# Patient Record
Sex: FEMALE | Race: WHITE | Employment: OTHER | URBAN - NONMETROPOLITAN AREA
[De-identification: names, ages, dates, MRNs, and addresses within clinical notes are randomized per-mention and may not be internally consistent; named-entity substitution may affect disease eponyms.]

---

## 2017-01-01 ENCOUNTER — TELEPHONE (OUTPATIENT)
Dept: CARDIOLOGY | Facility: OTHER | Age: 82
End: 2017-01-01

## 2017-01-01 ENCOUNTER — OFFICE VISIT (OUTPATIENT)
Dept: FAMILY MEDICINE | Facility: OTHER | Age: 82
End: 2017-01-01
Payer: COMMERCIAL

## 2017-01-01 ENCOUNTER — OFFICE VISIT (OUTPATIENT)
Dept: FAMILY MEDICINE | Facility: OTHER | Age: 82
End: 2017-01-01
Attending: FAMILY MEDICINE
Payer: COMMERCIAL

## 2017-01-01 DIAGNOSIS — Z51.5 HOSPICE CARE: Primary | ICD-10-CM

## 2017-01-01 PROCEDURE — 99207 ZZC NO CHARGE LOS: CPT | Performed by: FAMILY MEDICINE

## 2017-04-11 ENCOUNTER — OFFICE VISIT (OUTPATIENT)
Dept: CARDIOLOGY | Facility: OTHER | Age: 82
End: 2017-04-11
Attending: INTERNAL MEDICINE
Payer: COMMERCIAL

## 2017-04-11 DIAGNOSIS — I48.21 PERMANENT ATRIAL FIBRILLATION (H): Primary | ICD-10-CM

## 2017-04-11 DIAGNOSIS — I49.5 SINOATRIAL NODE DYSFUNCTION (H): ICD-10-CM

## 2017-04-11 PROBLEM — Z95.0 CARDIAC PACEMAKER IN SITU: Status: ACTIVE | Noted: 2017-04-11

## 2017-04-11 PROCEDURE — 93280 PM DEVICE PROGR EVAL DUAL: CPT | Mod: TC

## 2017-04-11 RX ORDER — WARFARIN SODIUM 2.5 MG/1
2.5 TABLET ORAL DAILY
Qty: 30 TABLET | Refills: 11 | Status: SHIPPED | OUTPATIENT
Start: 2017-04-11 | End: 2017-05-08

## 2017-04-11 NOTE — MR AVS SNAPSHOT
After Visit Summary   4/11/2017    Ricarda Bae    MRN: [id number]           Patient Information     Date Of Birth          1935        Visit Information        Provider Department      4/11/2017 11:30 AM  PACEMAKER [facility]        Today's Diagnoses     Permanent atrial fibrillation (H)    -  1    Sinoatrial node dysfunction (H)          Care Instructions    It was a pleasure to see you in clinic today.  Please do not hesitate to call with any questions or concerns.  You are scheduled for a remote transmission on 7/13/17.  We look forward to seeing you in clinic at your next device check in 6 months.    Bailey Driscoll, RN, MS, CCRN  Electrophysiology Nurse Clinician  [facility]    During Business Hours Please Call:  [phone]  After Hours Please Call:  [phone] - select option #4 and ask for job code [phone]                      Follow-ups after your visit        Additional Services     INR CLINIC REFERRAL       Your provider has referred you to INR Services.    Please be aware that coverage of these services is subject to the terms and limitations of your health insurance plan.  Call member services at your health plan with any benefit or coverage questions.    Indication for Anticoagulation: Atrial Fibrillation  If nonstandard INR is desired, indicate goal range and explanation: 2.0 - 2.5  Expected Duration of Therapy: Lifetime                  Follow-up notes from your care team     Return in about 6 months (around 10/11/2017) for Pacemaker Check.      Your next 10 appointments already scheduled     Apr 24, 2017  1:00 PM CDT   (Arrive by 12:45 PM)   New Visit with Devon Mccord DO   [facility]    [address] [place] [address]   [phone]            Oct 10, 2017  1:00 PM CDT   (Arrive by 12:45 PM)   New Visit with  PACEMAKER   [facility] [place] ([facility])    [address] [address]   [phone]              Who to contact     If you have questions or need follow up information about today's clinic visit or your schedule please contact [facility] directly at [phone].  Normal or non-critical lab and imaging results will be communicated to you by MyChart, letter or phone within 4 business days after the clinic has received the results. If you do not hear from us within 7 days, please contact the clinic through MyChart or phone. If you have a critical or abnormal lab result, we will notify you by phone as soon as possible.  Submit refill requests through [employer] or call your pharmacy and they will forward the refill request to us. Please allow 3 business days for your refill to be completed.          Additional Information About Your Visit        Ease [employer] Information     [employer] lets you send messages to your doctor, view your test results, renew your prescriptions, schedule appointments and more. To sign up, go to [email or web address] . Click on \"Log in\" on the left side of the screen, which will take you to the Welcome page. Then click on \"Sign up Now\" on the right side of the page.     You will be asked to enter the access code listed below, as well as some personal information. Please follow the directions to create your username and password.     Your access code is: [email or web address]  Expires: 7/10/2017  9:18 PM     Your access code will  in 90 days. If you need help or a new code, please call your [facility] or [phone].        Care EveryWhere ID     This is your Care EveryWhere ID. This could be used by other organizations to access your [place] medical records  [id number]         Blood Pressure from Last 3 Encounters:   16 162/100   16 138/68   16 140/67    Weight from Last 3 Encounters:   16 49.9 kg (110 lb)   16 63.8 kg (140 lb 9.6 oz)   16 62 kg (136 lb 11 oz)              We Performed the " Following     INR CLINIC REFERRAL     PM DEVICE PROGRAMMING EVAL, DUAL LEAD PACER          Today's Medication Changes          These changes are accurate as of: 4/11/17  9:18 PM.  If you have any questions, ask your nurse or doctor.               Start taking these medicines.        Dose/Directions    warfarin 2.5 MG tablet   Commonly known as:  COUMADIN   Used for:  Permanent atrial fibrillation (H)        Dose:  2.5 mg   Take 1 tablet (2.5 mg) by mouth daily   Quantity:  30 tablet   Refills:  11         These medicines have changed or have updated prescriptions.        Dose/Directions    enoxaparin 60 MG/0.6ML injection   Commonly known as:  LOVENOX   This may have changed:    - how much to take  - additional instructions   Used for:  Thrombosis of saphenous vein, left        Dose:  1 mg/kg   Inject 0.62 mLs (62 mg) Subcutaneous every 12 hours   Quantity:  30 Syringe   Refills:  1            Where to get your medicines      These medications were sent to [facility] - [address] AT [address] [address]     Phone:  [phone]     warfarin 2.5 MG tablet                Primary Care Provider Office Phone # Fax #    Darien Guillory [phone]       [facility] [address] [address]        Thank you!     Thank you for choosing [facility]  for your care. Our goal is always to provide you with excellent care. Hearing back from our patients is one way we can continue to improve our services. Please take a few minutes to complete the written survey that you may receive in the mail after your visit with us. Thank you!             Your Updated Medication List - Protect others around you: Learn how to safely use, store and throw away your medicines at www.disposemymeds.org.          This list is accurate as of: 4/11/17  9:18 PM.  Always use your most recent med list.                    Brand Name Dispense Instructions for use    alpha-d-galactosidase tablet      Take 2-3 tablets by mouth 3 times daily as needed for intestinal gas (before meal that is gas producing.)       ASPIRIN PO      Take 81 mg by mouth daily       bismuth subsalicylate 262 MG/15ML suspension    PEPTO BISMOL     Take 15 mLs by mouth every hour as needed for indigestion       diphenoxylate-atropine 2.5-0.025 MG per tablet    LOMOTIL     TAKE 1 TABLET BY MOUTH THREE TIMES DAILY AS NEEDED       enoxaparin 60 MG/0.6ML injection    LOVENOX    30 Syringe    Inject 0.62 mLs (62 mg) Subcutaneous every 12 hours       FLORASTOR 250 MG capsule   Generic drug:  saccharomyces boulardii      TAKE 1 CAPSULE BY MOUTH AT BEDTIME       FUROSEMIDE PO      Take 40 mg by mouth daily       HYDROCHLOROTHIAZIDE PO      Take 25 mg by mouth daily       HYDROcodone-acetaminophen 5-325 MG per tablet    NORCO    15 tablet    Take 1 tablet by mouth every 6 hours as needed for moderate to severe pain       hypromellose 0.4 % Soln ophthalmic solution    ARTIFICIAL TEARS    1 Bottle    Place 1 drop into both eyes every hour as needed for dry eyes       magnesium hydroxide 400 MG/5ML suspension    MILK OF MAGNESIA     Take 5 mLs by mouth daily as needed for constipation or heartburn       MELATONIN PO      Take 10 mg by mouth At Bedtime       metoprolol 100 MG tablet    LOPRESSOR    60 tablet    Take 1 tablet (100 mg) by mouth 2 times daily       ondansetron 4 MG ODT tab    ZOFRAN-ODT     DISSOLVE 1 TABLET ON TONGUE EVERY 6 HOURS AS NEEDED FOR NAUSEA       potassium chloride naman er    K-DUR     Take 40 mEq by mouth 2 times daily       psyllium 0.52 G capsule      Take 1 capsule by mouth daily       warfarin 2.5 MG tablet    COUMADIN    30 tablet    Take 1 tablet (2.5 mg) by mouth daily       WELLBUTRIN XL PO      Take 300 mg by mouth daily

## 2017-04-12 NOTE — PROGRESS NOTES
"Patient seen at the [facility] for evaluation and iterative programming of her Medtronic dual lead pacemaker per MD orders.  Patient is new to the device clinic today.  Patient is transferring her device care from [facility] today.  Patient presents to clinic with the understanding that she was coming to establish care with a cardiologist today.  Dr. Jimenez notified.  Interrogation results reviewed with Dr. Jimenez.  Dr. Jimenez spoke with patient and her  regarding anticoagulation.  Patient reports that the ER MD discontinued her coumadin at some point.  Dr. Jimenez recommends that patient start coumadin due to her history of AFib, CVA and age.  Patient will be scheduled to see Dr. Mccord on 4/24/17 to establish care with cardiology and further discuss anticoagulation needs.  Normal pacemaker function.  9629 AT/AF episodes recorded - 26 min - >96 hrs in duration.  AF burden = 15.3%.  Intrinsic rhythm = AFib with vent rate @ ~ 60 bpm.  AP = 22.5%.   = 30.1%.  Estimated battery longevity to JOANNE = 6.5 years.  Patient reports that she is feeling \"okay\" today.  Plan for patient to send a remote transmission in 3 months and return to clinic in 6 months.  Will request that Tiana Bear RN obtain records from patient's primary care MD.      Dual lead pacemaker        "

## 2017-04-12 NOTE — PATIENT INSTRUCTIONS
It was a pleasure to see you in clinic today.  Please do not hesitate to call with any questions or concerns.  You are scheduled for a remote transmission on 7/13/17.  We look forward to seeing you in clinic at your next device check in 6 months.    Bailey Driscoll, RN, MS, CCRN  Electrophysiology Nurse Clinician  [facility]    During Business Hours Please Call:  [phone]  After Hours Please Call:  [phone] - select option #4 and ask for job code [phone]

## 2017-04-13 ENCOUNTER — ANTICOAGULATION THERAPY VISIT (OUTPATIENT)
Dept: ANTICOAGULATION | Facility: OTHER | Age: 82
End: 2017-04-13

## 2017-04-13 DIAGNOSIS — Z79.01 LONG-TERM (CURRENT) USE OF ANTICOAGULANTS: ICD-10-CM

## 2017-04-13 DIAGNOSIS — I48.91 A-FIB (H): ICD-10-CM

## 2017-04-13 DIAGNOSIS — Z86.73 HISTORY OF STROKE: ICD-10-CM

## 2017-04-13 DIAGNOSIS — Z98.890 S/P CAROTID ENDARTERECTOMY: ICD-10-CM

## 2017-04-13 NOTE — PROGRESS NOTES
ANTICOAGULATION FOLLOW-UP CLINIC VISIT    Patient Name:  Ricarda Bae  Date:  4/13/2017  Contact Type:  Telephone/ Aleksandar-spouse    SUBJECTIVE:     Patient Findings     Positives Initiation of therapy    Comments 4/12/17-AC clinic received an INR Referral for this pt per Dr Jimenez, Cardiology.  AC nurse telephoned pt to set up an appt to start her AC therapy and give choices of clinic preference for this treatment.  Pt stated she preferred this conversation be conducted with her .  She stated her  was unavailable at that time but would be home after lunch.  She stated she would give him the contact information to further discuss this issue.  By the end of business this day, no return call had been received.  Will contact pt on 4/13/17.     4/13/17 - AC nurse telephoned pt, left a voice message with contact information to discuss the AC therapy plan.  Pt/ strongly encouraged to return this call today, due to the closure of the clinic tomorrow in observance of the Good Friday holiday.      AC nurse spoke by phone with pts  Aleksandar today.  He stated a provider had instructed the pt to stop taking her Coumadin last fall; therefore they just followed directions.  He stated he picked up the script from Dr Jimenez but had not started giving the med to his wife yet.  Discussed the benefits of treating the Atrial Fib; discussed past history of CVA and the Carotid Endarterectomy.   given the choice of AC programs - [facility] or her current PCP/[facility].   states they would prefer to come to [facility] as she will be connected to the Cardiology at [facility].  He stated familiarity with the INR testing from the past; discussed the initial AC visit.  Discussed the best time to administer the Coumadin.  Discussed dosing and INR initial visit appt.  Call ended with questions answered and understanding stated.             OBJECTIVE    INR   Date Value Ref Range Status   10/31/2016 1.21 (H)  0.80 - 1.20 Final       ASSESSMENT / PLAN  INR assessment  none   Recheck INR In: 4 DAYS    INR Location Clinic      Anticoagulation Summary as of 4/13/2017     INR goal 2.0-3.0   Today's INR No new INR was available at the time of this encounter.   Maintenance plan No maintenance plan   Full instructions 4/13: 5 mg; 4/14: 2.5 mg; 4/15: 2.5 mg; 4/16: 2.5 mg; Otherwise No maintenance plan   Next INR check 4/17/2017   Target end date Indefinite    Indications   A-fib (H) [I48.91]  S/P carotid endarterectomy [Z98.890]  History of stroke [Z86.73]  Long-term (current) use of anticoagulants [Z79.01] [Z79.01]         Anticoagulation Episode Summary     INR check location     Preferred lab     Send INR reminders to [facility] POOL    Comments       Anticoagulation Care Providers     Provider Role Specialty Phone number    Esau Jimenez MD Referring Clinical Cardiac Electrophysiology [phone]    Darien Guillory MD [facility] [phone]            See the Encounter Report to view Anticoagulation Flowsheet and Dosing Calendar (Go to Encounters tab in chart review, and find the Anticoagulation Therapy Visit)        Gisela Bateman RN

## 2017-04-13 NOTE — MR AVS SNAPSHOT
Ricarda Bea   4/13/2017   Anticoagulation Therapy Visit    Description:  82 year old female   Provider:  Darien Guillory MD   Department:  [facility]           INR as of 4/13/2017     Today's INR No new INR was available at the time of this encounter.      Anticoagulation Summary as of 4/13/2017     INR goal 2.0-3.0   Today's INR No new INR was available at the time of this encounter.   Full instructions 4/13: 5 mg; 4/14: 2.5 mg; 4/15: 2.5 mg; 4/16: 2.5 mg; Otherwise No maintenance plan   Next INR check 4/17/2017    Indications   A-fib (H) [I48.91]  S/P carotid endarterectomy [Z98.890]  History of stroke [Z86.73]  Long-term (current) use of anticoagulants [Z79.01] [Z79.01]         April 2017 Details    Sun Mon Tue Wed Thu Fri Sat           1                 2               3               4               5               6               7               8                 9               10               11               12               13      5 mg   See details      14      2.5 mg         15      2.5 mg           16      2.5 mg         17            18               19               20               21               22                 23               24               25               26               27               28               29                 30                      Date Details   04/13 This INR check       Date of next INR:  4/17/2017         How to take your warfarin dose     To take:  2.5 mg Take 1 of the 2.5 mg tablets.    To take:  5 mg Take 2 of the 2.5 mg tablets.

## 2017-04-21 ENCOUNTER — ANTICOAGULATION THERAPY VISIT (OUTPATIENT)
Dept: ANTICOAGULATION | Facility: OTHER | Age: 82
End: 2017-04-21
Attending: FAMILY MEDICINE
Payer: MEDICARE

## 2017-04-21 DIAGNOSIS — Z86.73 HISTORY OF STROKE: ICD-10-CM

## 2017-04-21 DIAGNOSIS — Z98.890 S/P CAROTID ENDARTERECTOMY: ICD-10-CM

## 2017-04-21 DIAGNOSIS — Z79.01 LONG-TERM (CURRENT) USE OF ANTICOAGULANTS: ICD-10-CM

## 2017-04-21 LAB — INR POINT OF CARE: 1.4 (ref 0.86–1.14)

## 2017-04-21 PROCEDURE — 85610 PROTHROMBIN TIME: CPT | Mod: QW

## 2017-04-21 NOTE — PROGRESS NOTES
ANTICOAGULATION INITIAL CLINIC VISIT    Patient Name:  Ricarda Bae  Date:  4/21/2017  Referred by: Dr. Jimenez,   Contact Type:  Face to Face    SUBJECTIVE:  Coumadin education was completed today.  Topics covered include:  -Introduction to coumadin  -Proper Administration, we discussed that warfarin is to be taken in the evening and why that is  -INR Testing, We discussed POC testing and what it is  -Sign/Symptoms of Bleeding, we talked about bleeding,bruising. How to stop minor bleeding with pressure and when to go to ER for bleeding.  -Signs/Symptoms of Clot Formation or Stroke, we talked about symptoms of stroke/head bleed and to go to ER immediately. We also discussed difference in symptoms of venous and arterial clots.   -Dietary Intake of Vitamin K, we discussed vit K intake and how it affects warfarin. We also discussed other sources of vit K  -Drug Interactions, we discussed that prescription, OTC drugs and herbal products can interact with warfarin and to call us if any medication is added or  Taken away from what you currently take.  -Anticoagulation Identification (bracelet, necklace or wallet card) Patient given wallet card and told to put it by drivers liscense  -Future Surgery, we discussed that anytime you have any biopsies, joint or back injections, endoscopies, colonoscopy to let us know as warfarin needs to be stopped for short period of time.   -Effects of Alcohol, Tobacco, and Exercise on Coumadin. We discussed that ETOH can affect warfarin but primarily if you have more than usual amount of ETOH    Coumadin Education Booklet and Coumadin Identification Wallet Card were given to the patient.       Patient Findings     Positives No Problem Findings          OBJECTIVE    INR Protime   Date Value Ref Range Status   04/21/2017 1.4 (A) 0.86 - 1.14 Final       ASSESSMENT / PLAN  INR assessment SUB    Recheck INR In: 6 DAYS    INR Location Clinic      Anticoagulation Summary as of 4/21/2017      INR goal 2.0-3.0   Today's INR 1.4!   Maintenance plan No maintenance plan   Full instructions 4/21: 5 mg; 4/22: 2.5 mg; 4/23: 2.5 mg; 4/24: 5 mg; 4/25: 2.5 mg; 4/26: 2.5 mg; Otherwise No maintenance plan   Next INR check 4/27/2017   Target end date Indefinite    Indications   A-fib (H) [I48.91]  S/P carotid endarterectomy [Z98.890]  History of stroke [Z86.73]  Long-term (current) use of anticoagulants [Z79.01] [Z79.01]         Anticoagulation Episode Summary     INR check location     Preferred lab     Send INR reminders to  ANT[facility]AG POOL    Comments       Anticoagulation Care Providers     Provider Role Specialty Phone number    Esau Jimenez MD Referring Clinical Cardiac Electrophysiology [phone]    Darien Guillory MD [facility] [phone]            See the Encounter Report to view Anticoagulation Flowsheet and Dosing Calendar (Go to Encounters tab in chart review, and find the Anticoagulation Therapy Visit)        Gisela Vidal RN

## 2017-04-21 NOTE — MR AVS SNAPSHOT
Ricarda Bae   4/21/2017 2:00 PM   Anticoagulation Therapy Visit    Description:  82 year old female   Provider:   ANTI COAGULATION   Department:  [facility] Coagulation           INR as of 4/21/2017     Today's INR 1.4!      Anticoagulation Summary as of 4/21/2017     INR goal 2.0-3.0   Today's INR 1.4!   Full instructions 4/21: 5 mg; 4/22: 2.5 mg; 4/23: 2.5 mg; 4/24: 5 mg; 4/25: 2.5 mg; 4/26: 2.5 mg; Otherwise No maintenance plan   Next INR check 4/27/2017    Indications   A-fib (H) [I48.91]  S/P carotid endarterectomy [Z98.890]  History of stroke [Z86.73]  Long-term (current) use of anticoagulants [Z79.01] [Z79.01]         Your next Anticoagulation Clinic appointment(s)     Apr 27, 2017  1:15 PM CDT   Anticoagulation Visit with  ANTI COAGULATION   [facility] ([facility] [place] [facility]    [address]  [place] [address]   [phone]              April 2017 Details    Sun Mon Tue Wed Thu Fri Sat           1                 2               3               4               5               6               7               8                 9               10               11               12               13               14               15                 16               17               18               19               20               21      5 mg   See details      22      2.5 mg           23      2.5 mg         24      5 mg         25      2.5 mg         26      2.5 mg         27            28               29                 30                      Date Details   04/21 This INR check       Date of next INR:  4/27/2017         How to take your warfarin dose     To take:  2.5 mg Take 1 of the 2.5 mg tablets.    To take:  5 mg Take 2 of the 2.5 mg tablets.

## 2017-04-27 ENCOUNTER — ANTICOAGULATION THERAPY VISIT (OUTPATIENT)
Dept: ANTICOAGULATION | Facility: OTHER | Age: 82
End: 2017-04-27
Attending: FAMILY MEDICINE
Payer: MEDICARE

## 2017-04-27 DIAGNOSIS — Z79.01 LONG-TERM (CURRENT) USE OF ANTICOAGULANTS: ICD-10-CM

## 2017-04-27 DIAGNOSIS — Z86.73 HISTORY OF STROKE: ICD-10-CM

## 2017-04-27 DIAGNOSIS — Z98.890 S/P CAROTID ENDARTERECTOMY: ICD-10-CM

## 2017-04-27 LAB — INR POINT OF CARE: 1.2 (ref 0.86–1.14)

## 2017-04-27 PROCEDURE — 85610 PROTHROMBIN TIME: CPT | Mod: QW

## 2017-04-27 NOTE — PROGRESS NOTES
ANTICOAGULATION FOLLOW-UP CLINIC VISIT    Patient Name:  Ricarda Bae  Date:  4/27/2017  Contact Type:  Face to Face    SUBJECTIVE:     Patient Findings     Positives No Problem Findings           OBJECTIVE    INR Protime   Date Value Ref Range Status   04/27/2017 1.12 0.86 - 1.14 Final       ASSESSMENT / PLAN  INR assessment SUB    Recheck INR In: 4 DAYS    INR Location Clinic      Anticoagulation Summary as of 4/27/2017     INR goal 2.0-3.0   Today's INR 1.12!   Maintenance plan No maintenance plan   Full instructions 4/27: 5 mg; 4/28: 5 mg; 4/29: 5 mg; 4/30: 5 mg; Otherwise No maintenance plan   Next INR check 5/1/2017   Target end date Indefinite    Indications   A-fib (H) [I48.91]  S/P carotid endarterectomy [Z98.890]  History of stroke [Z86.73]  Long-term (current) use of anticoagulants [Z79.01] [Z79.01]         Anticoagulation Episode Summary     INR check location     Preferred lab     Send INR reminders to  [facility] POOL    Comments       Anticoagulation Care Providers     Provider Role Specialty Phone number    Esau Jimenez MD Referring Clinical Cardiac Electrophysiology [phone]    Darien Guillory MD [facility] [phone]            See the Encounter Report to view Anticoagulation Flowsheet and Dosing Calendar (Go to Encounters tab in chart review, and find the Anticoagulation Therapy Visit)        Gisela Vidal RN

## 2017-04-27 NOTE — MR AVS SNAPSHOT
Ricarda Bae   4/27/2017 1:15 PM   Anticoagulation Therapy Visit    Description:  82 year old female   Provider:   ANTI COAGULATION   Department:  [facility] Anti Coagulation           INR as of 4/27/2017     Today's INR 1.12!      Anticoagulation Summary as of 4/27/2017     INR goal 2.0-3.0   Today's INR 1.12!   Full instructions 4/27: 5 mg; 4/28: 5 mg; 4/29: 5 mg; 4/30: 5 mg; Otherwise No maintenance plan   Next INR check 5/1/2017    Indications   A-fib (H) [I48.91]  S/P carotid endarterectomy [Z98.890]  History of stroke [Z86.73]  Long-term (current) use of anticoagulants [Z79.01] [Z79.01]         Your next Anticoagulation Clinic appointment(s)     May 01, 2017  1:15 PM CDT   Anticoagulation Visit with  ANTI COAGULATION   [facility]    [address] [address]   [phone]              April 2017 Details    Sun Mon Tue Wed Thu Fri Sat           1                 2               3               4               5               6               7               8                 9               10               11               12               13               14               15                 16               17               18               19               20               21               22                 23               24               25               26               27      5 mg   See details      28      5 mg         29      5 mg           30      5 mg                Date Details   04/27 This INR check               How to take your warfarin dose     To take:  5 mg Take 2 of the 2.5 mg tablets.           May 2017 Details    Sun Mon Tue Wed Thu Fri Sat      1            2               3               4               5               6                 7               8               9               10               11               12               13                 14               15               16               17               18               19                20                 21               22               23               24               25               26               27                 28               29               30               31                   Date Details   No additional details    Date of next INR:  5/1/2017

## 2017-05-01 ENCOUNTER — ANTICOAGULATION THERAPY VISIT (OUTPATIENT)
Dept: ANTICOAGULATION | Facility: OTHER | Age: 82
End: 2017-05-01

## 2017-05-01 DIAGNOSIS — Z98.890 S/P CAROTID ENDARTERECTOMY: ICD-10-CM

## 2017-05-01 DIAGNOSIS — Z86.73 HISTORY OF STROKE: ICD-10-CM

## 2017-05-01 DIAGNOSIS — I48.91 A-FIB (H): ICD-10-CM

## 2017-05-01 DIAGNOSIS — Z79.01 LONG-TERM (CURRENT) USE OF ANTICOAGULANTS: ICD-10-CM
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
## 2017-05-01 NOTE — MR AVS SNAPSHOT
Ricarda Bae   5/1/2017   Anticoagulation Therapy Visit    Description:  82 year old female   Provider:  Darien Guillory MD   Department:  Hc Anti Coagulation           INR as of 5/1/2017     Today's INR No new INR was available at the time of this encounter.      Anticoagulation Summary as of 5/1/2017     INR goal 2.0-3.0   Today's INR No new INR was available at the time of this encounter.   Full instructions 5/1: 2.5 mg; 5/2: 5 mg; Otherwise No maintenance plan   Next INR check 5/3/2017    Indications   A-fib (H) [I48.91]  S/P carotid endarterectomy [Z98.890]  History of stroke [Z86.73]  Long-term (current) use of anticoagulants [Z79.01] [Z79.01]         Your next Anticoagulation Clinic appointment(s)     May 03, 2017  1:00 PM CDT   Anticoagulation Visit with  ANTI COAGULATION   [facility]    [address] [facility] [address]   [phone]              May 2017 Details    Sun Mon Tue Wed Thu Fri Sat      1      2.5 mg   See details      2      5 mg         3            4               5               6                 7               8               9               10               11               12               13                 14               15               16               17               18               19               20                 21               22               23               24               25               26               27                 28               29               30               31                   Date Details   05/01 This INR check       Date of next INR:  5/3/2017         How to take your warfarin dose     To take:  2.5 mg Take 1 of the 2.5 mg tablets.    To take:  5 mg Take 2 of the 2.5 mg tablets.

## 2017-05-03 ENCOUNTER — ANTICOAGULATION THERAPY VISIT (OUTPATIENT)
Dept: ANTICOAGULATION | Facility: OTHER | Age: 82
End: 2017-05-03
Attending: FAMILY MEDICINE
Payer: MEDICARE

## 2017-05-03 DIAGNOSIS — Z98.890 S/P CAROTID ENDARTERECTOMY: ICD-10-CM

## 2017-05-03 DIAGNOSIS — Z86.73 HISTORY OF STROKE: ICD-10-CM

## 2017-05-03 DIAGNOSIS — Z79.01 LONG-TERM (CURRENT) USE OF ANTICOAGULANTS: ICD-10-CM

## 2017-05-03 LAB — INR POINT OF CARE: 1.5 (ref 0.86–1.14)

## 2017-05-03 PROCEDURE — 85610 PROTHROMBIN TIME: CPT | Mod: QW,ZL

## 2017-05-03 RX ORDER — WARFARIN SODIUM 5 MG/1
TABLET ORAL
Qty: 30 TABLET | Refills: 3 | Status: SHIPPED | OUTPATIENT
Start: 2017-05-03 | End: 2017-05-08

## 2017-05-03 NOTE — MR AVS SNAPSHOT
Ricarda Bae   5/3/2017 1:00 PM   Anticoagulation Therapy Visit    Description:  82 year old female   Provider:   ANTI COAGULATION   Department:  Hc Anti Coagulation           INR as of 5/3/2017     Today's INR 1.5!      Anticoagulation Summary as of 5/3/2017     INR goal 2.0-3.0   Today's INR 1.5!   Full instructions 5/3: 5 mg; 5/4: 5 mg; 5/5: 5 mg; 5/6: 5 mg; 5/7: 5 mg; Otherwise No maintenance plan   Next INR check 5/8/2017    Indications   A-fib (H) [I48.91]  S/P carotid endarterectomy [Z98.890]  History of stroke [Z86.73]  Long-term (current) use of anticoagulants [Z79.01] [Z79.01]         Your next Anticoagulation Clinic appointment(s)     May 08, 2017  1:00 PM CDT   Anticoagulation Visit with  ANTI COAGULATION   [facility] [place] ([facility])    [address]  [place] [address]   [phone]              May 2017 Details    Sun Mon Tue Wed Thu Fri Sat      1               2               3      5 mg   See details      4      5 mg         5      5 mg         6      5 mg           7      5 mg         8            9               10               11               12               13                 14               15               16               17               18               19               20                 21               22               23               24               25               26               27                 28               29               30               31                   Date Details   05/03 This INR check       Date of next INR:  5/8/2017         How to take your warfarin dose     To take:  5 mg Take 2 of the 2.5 mg tablets.

## 2017-05-03 NOTE — PROGRESS NOTES
ANTICOAGULATION FOLLOW-UP CLINIC VISIT    Patient Name:  Ricarda Bae  Date:  5/3/2017  Contact Type:  Face to Face    SUBJECTIVE:     Patient Findings     Positives No Problem Findings           OBJECTIVE    INR Protime   Date Value Ref Range Status   05/03/2017 1.5 (A) 0.86 - 1.14 Final       ASSESSMENT / PLAN  INR assessment SUB    Recheck INR In: 5 DAYS    INR Location Clinic      Anticoagulation Summary as of 5/3/2017     INR goal 2.0-3.0   Today's INR 1.5!   Maintenance plan No maintenance plan   Full instructions 5/3: 5 mg; 5/4: 5 mg; 5/5: 5 mg; 5/6: 5 mg; 5/7: 5 mg; Otherwise No maintenance plan   Next INR check 5/8/2017   Target end date Indefinite    Indications   A-fib (H) [I48.91]  S/P carotid endarterectomy [Z98.890]  History of stroke [Z86.73]  Long-term (current) use of anticoagulants [Z79.01] [Z79.01]         Anticoagulation Episode Summary     INR check location     Preferred lab     Send INR reminders to [facility] POOL    Comments       Anticoagulation Care Providers     Provider Role Specialty Phone number    Esau Jimenez MD Referring Clinical Cardiac Electrophysiology [phone]    Darien Guillory MD [facility] [phone]            See the Encounter Report to view Anticoagulation Flowsheet and Dosing Calendar (Go to Encounters tab in chart review, and find the Anticoagulation Therapy Visit)        Gisela Vidal RN

## 2017-05-08 ENCOUNTER — ANTICOAGULATION THERAPY VISIT (OUTPATIENT)
Dept: ANTICOAGULATION | Facility: OTHER | Age: 82
End: 2017-05-08
Attending: FAMILY MEDICINE
Payer: MEDICARE

## 2017-05-08 DIAGNOSIS — Z98.890 S/P CAROTID ENDARTERECTOMY: ICD-10-CM

## 2017-05-08 DIAGNOSIS — I48.21 PERMANENT ATRIAL FIBRILLATION (H): ICD-10-CM

## 2017-05-08 DIAGNOSIS — I48.91 A-FIB (H): ICD-10-CM

## 2017-05-08 DIAGNOSIS — Z86.73 HISTORY OF STROKE: ICD-10-CM

## 2017-05-08 DIAGNOSIS — Z79.01 LONG-TERM (CURRENT) USE OF ANTICOAGULANTS: ICD-10-CM

## 2017-05-08 LAB — INR POINT OF CARE: 1.3 (ref 0.86–1.14)

## 2017-05-08 PROCEDURE — 85610 PROTHROMBIN TIME: CPT | Mod: QW

## 2017-05-08 RX ORDER — WARFARIN SODIUM 2.5 MG/1
TABLET ORAL
Qty: 30 TABLET | Refills: 11 | COMMUNITY
Start: 2017-05-08

## 2017-05-08 RX ORDER — WARFARIN SODIUM 5 MG/1
TABLET ORAL
Qty: 30 TABLET | Refills: 3 | COMMUNITY
Start: 2017-05-08 | End: 2017-05-12

## 2017-05-08 NOTE — MR AVS SNAPSHOT
Ricarda Bae   5/8/2017 1:00 PM   Anticoagulation Therapy Visit    Description:  82 year old female   Provider:   ANTI COAGULATION   Department:  Hc Anti Coagulation           INR as of 5/8/2017     Today's INR 1.3!      Anticoagulation Summary as of 5/8/2017     INR goal 2.0-3.0   Today's INR 1.3!   Full instructions 5/9: 7.5 mg; 5/11: 7.5 mg; Otherwise 7.5 mg on Mon; 5 mg all other days   Next INR check 5/12/2017    Indications   A-fib (H) [I48.91]  S/P carotid endarterectomy [Z98.890]  History of stroke [Z86.73]  Long-term (current) use of anticoagulants [Z79.01] [Z79.01]         Your next Anticoagulation Clinic appointment(s)     May 12, 2017  1:00 PM CDT   Anticoagulation Visit with  ANTI COAGULATION   [facility] [place] ([facility])    [address] [address]   [phone]              May 2017 Details    Sun Mon Tue Wed Thu Fri Sat      1               2               3               4               5               6                 7               8      7.5 mg   See details      9      7.5 mg         10      5 mg         11      7.5 mg         12            13                 14               15               16               17               18               19               20                 21               22               23               24               25               26               27                 28               29               30               31                   Date Details   05/08 This INR check       Date of next INR:  5/12/2017         How to take your warfarin dose     To take:  5 mg Take 1 of the 5 mg tablets.    To take:  7.5 mg Take 1.5 of the 5 mg tablets.

## 2017-05-08 NOTE — PROGRESS NOTES
ANTICOAGULATION FOLLOW-UP CLINIC VISIT    Patient Name:  Ricarda Bae  Date:  5/8/2017  Contact Type:  Face to Face    SUBJECTIVE:     Patient Findings     Positives Initiation of therapy    Comments Using the 5mg tabs; have used up all the green(2.5mg) tabs per .  Pt eating small meals, but primarily this has not changed since started on Coumadin.             OBJECTIVE    INR Protime   Date Value Ref Range Status   05/08/2017 1.3 (A) 0.86 - 1.14 Final       ASSESSMENT / PLAN  INR assessment SUB    Recheck INR In: 4 DAYS    INR Location Clinic      Anticoagulation Summary as of 5/8/2017     INR goal 2.0-3.0   Today's INR 1.3!   Maintenance plan 7.5 mg (5 mg x 1.5) on Mon; 5 mg (5 mg x 1) all other days   Full instructions 5/9: 7.5 mg; 5/11: 7.5 mg; Otherwise 7.5 mg on Mon; 5 mg all other days   Weekly total 37.5 mg   Plan last modified Gisela Bateman RN (5/8/2017)   Next INR check 5/12/2017   Target end date Indefinite    Indications   A-fib (H) [I48.91]  S/P carotid endarterectomy [Z98.890]  History of stroke [Z86.73]  Long-term (current) use of anticoagulants [Z79.01] [Z79.01]         Anticoagulation Episode Summary     INR check location     Preferred lab     Send INR reminders to [facility] POOL    Comments       Anticoagulation Care Providers     Provider Role Specialty Phone number    TonyEsau toledo MD Referring Clinical Cardiac Electrophysiology [phone]    Darien Guillory MD [facility] [phone]            See the Encounter Report to view Anticoagulation Flowsheet and Dosing Calendar (Go to Encounters tab in chart review, and find the Anticoagulation Therapy Visit)        Gisela Bateman RN

## 2017-05-12 ENCOUNTER — ANTICOAGULATION THERAPY VISIT (OUTPATIENT)
Dept: ANTICOAGULATION | Facility: OTHER | Age: 82
End: 2017-05-12
Attending: FAMILY MEDICINE
Payer: MEDICARE

## 2017-05-12 DIAGNOSIS — Z79.01 LONG-TERM (CURRENT) USE OF ANTICOAGULANTS: ICD-10-CM

## 2017-05-12 DIAGNOSIS — Z86.73 HISTORY OF STROKE: ICD-10-CM

## 2017-05-12 DIAGNOSIS — Z98.890 S/P CAROTID ENDARTERECTOMY: ICD-10-CM

## 2017-05-12 LAB — INR POINT OF CARE: 1.8 (ref 0.86–1.14)

## 2017-05-12 PROCEDURE — 85610 PROTHROMBIN TIME: CPT | Mod: QW

## 2017-05-12 RX ORDER — WARFARIN SODIUM 5 MG/1
TABLET ORAL
Qty: 30 TABLET | Refills: 3 | COMMUNITY
Start: 2017-05-12 | End: 2017-05-19

## 2017-05-12 NOTE — MR AVS SNAPSHOT
Ricarda Bae   5/12/2017 1:00 PM   Anticoagulation Therapy Visit    Description:  82 year old female   Provider:   ANTI COAGULATION   Department:  Hc Anti Coagulation           INR as of 5/12/2017     Today's INR 1.8!      Anticoagulation Summary as of 5/12/2017     INR goal 2.0-3.0   Today's INR 1.8!   Full instructions 5/12: 7.5 mg; Otherwise 7.5 mg on Mon; 5 mg all other days   Next INR check 5/19/2017    Indications   A-fib (H) [I48.91]  S/P carotid endarterectomy [Z98.890]  History of stroke [Z86.73]  Long-term (current) use of anticoagulants [Z79.01] [Z79.01]         Your next Anticoagulation Clinic appointment(s)     May 19, 2017 10:45 AM CDT   Anticoagulation Visit with  ANTI COAGULATION   [facility] [place] [facility] [place] [facility]    [address] [facility] [place] [address]   [phone]              May 2017 Details    Sun Mon Tue Wed Thu Fri Sat      1               2               3               4               5               6                 7               8               9               10               11               12      7.5 mg   See details      13      5 mg           14      5 mg         15      7.5 mg         16      5 mg         17      5 mg         18      5 mg         19            20                 21               22               23               24               25               26               27                 28               29               30               31                   Date Details   05/12 This INR check       Date of next INR:  5/19/2017         How to take your warfarin dose     To take:  5 mg Take 1 of the 5 mg tablets.    To take:  7.5 mg Take 1.5 of the 5 mg tablets.

## 2017-05-12 NOTE — PROGRESS NOTES
ANTICOAGULATION FOLLOW-UP CLINIC VISIT    Patient Name:  Ricarda Bae  Date:  5/12/2017  Contact Type:  Face to Face    SUBJECTIVE:     Patient Findings     Positives Extra doses, Initiation of therapy           OBJECTIVE    INR Protime   Date Value Ref Range Status   05/12/2017 1.8 (A) 0.86 - 1.14 Final       ASSESSMENT / PLAN  INR assessment SUB    Recheck INR In: 1 WEEK    INR Location Clinic      Anticoagulation Summary as of 5/12/2017     INR goal 2.0-3.0   Today's INR 1.8!   Maintenance plan 7.5 mg (5 mg x 1.5) on Mon; 5 mg (5 mg x 1) all other days   Full instructions 5/12: 7.5 mg; Otherwise 7.5 mg on Mon; 5 mg all other days   Weekly total 37.5 mg   Plan last modified Gisela Bateman RN (5/8/2017)   Next INR check 5/19/2017   Target end date Indefinite    Indications   A-fib (H) [I48.91]  S/P carotid endarterectomy [Z98.890]  History of stroke [Z86.73]  Long-term (current) use of anticoagulants [Z79.01] [Z79.01]         Anticoagulation Episode Summary     INR check location     Preferred lab     Send INR reminders to [facility] POOL    Comments       Anticoagulation Care Providers     Provider Role Specialty Phone number    Esau Jimenez MD Referring Clinical Cardiac Electrophysiology [phone]    Darien Guillory MD [facility] [phone]            See the Encounter Report to view Anticoagulation Flowsheet and Dosing Calendar (Go to Encounters tab in chart review, and find the Anticoagulation Therapy Visit)        Gisela Bateman RN

## 2017-05-19 ENCOUNTER — ANTICOAGULATION THERAPY VISIT (OUTPATIENT)
Dept: ANTICOAGULATION | Facility: OTHER | Age: 82
End: 2017-05-19
Attending: FAMILY MEDICINE
Payer: MEDICARE

## 2017-05-19 DIAGNOSIS — I48.91 A-FIB (H): ICD-10-CM

## 2017-05-19 DIAGNOSIS — Z86.73 HISTORY OF STROKE: ICD-10-CM

## 2017-05-19 DIAGNOSIS — Z98.890 S/P CAROTID ENDARTERECTOMY: ICD-10-CM

## 2017-05-19 DIAGNOSIS — Z79.01 LONG-TERM (CURRENT) USE OF ANTICOAGULANTS: ICD-10-CM

## 2017-05-19 LAB — INR POINT OF CARE: 1.5 (ref 0.86–1.14)

## 2017-05-19 PROCEDURE — 85610 PROTHROMBIN TIME: CPT | Mod: QW

## 2017-05-19 RX ORDER — WARFARIN SODIUM 5 MG/1
5 TABLET ORAL DAILY
Qty: 30 TABLET | Refills: 3 | COMMUNITY
Start: 2017-05-19

## 2017-05-19 NOTE — PROGRESS NOTES
ANTICOAGULATION FOLLOW-UP CLINIC VISIT    Patient Name:  Ricarda Bae  Date:  5/19/2017  Contact Type:  Face to Face    SUBJECTIVE:     Patient Findings     Positives Initiation of therapy, Activity level change    Comments Discussed dosing taken - no issues.  Discussed no other changes.           OBJECTIVE    INR Protime   Date Value Ref Range Status   05/19/2017 1.5 (A) 0.86 - 1.14 Final       ASSESSMENT / PLAN  INR assessment SUB    Recheck INR In: 10 DAYS    INR Location Clinic      Anticoagulation Summary as of 5/19/2017     INR goal 2.0-3.0   Today's INR 1.5!   Maintenance plan 7.5 mg (5 mg x 1.5) on Mon, Wed, Fri; 5 mg (5 mg x 1) all other days   Full instructions 5/20: 7.5 mg; Otherwise 7.5 mg on Mon, Wed, Fri; 5 mg all other days   Weekly total 42.5 mg   Plan last modified Gisela Bateman RN (5/19/2017)   Next INR check 5/31/2017   Target end date Indefinite    Indications   A-fib (H) [I48.91]  S/P carotid endarterectomy [Z98.890]  History of stroke [Z86.73]  Long-term (current) use of anticoagulants [Z79.01] [Z79.01]         Anticoagulation Episode Summary     INR check location     Preferred lab     Send INR reminders to  [facility] POOL    Comments       Anticoagulation Care Providers     Provider Role Specialty Phone number    Esau Jimenez MD Referring Clinical Cardiac Electrophysiology [phone]    Darien Guillory MD [facility] [phone]            See the Encounter Report to view Anticoagulation Flowsheet and Dosing Calendar (Go to Encounters tab in chart review, and find the Anticoagulation Therapy Visit)        Gisela Bateman RN

## 2017-05-19 NOTE — MR AVS SNAPSHOT
Ricarda Bae   5/19/2017 10:45 AM   Anticoagulation Therapy Visit    Description:  82 year old female   Provider:   ANTI COAGULATION   Department:  Hc Anti Coagulation           INR as of 5/19/2017     Today's INR 1.5!      Anticoagulation Summary as of 5/19/2017     INR goal 2.0-3.0   Today's INR 1.5!   Full instructions 5/20: 7.5 mg; Otherwise 7.5 mg on Mon, Wed, Fri; 5 mg all other days   Next INR check 5/31/2017    Indications   A-fib (H) [I48.91]  S/P carotid endarterectomy [Z98.890]  History of stroke [Z86.73]  Long-term (current) use of anticoagulants [Z79.01] [Z79.01]         Your next Anticoagulation Clinic appointment(s)     May 19, 2017 10:45 AM CDT   Anticoagulation Visit with  ANTI COAGULATION   [facility] ([facility])    [address] [facility] [address] [address]   [phone]            May 31, 2017 10:30 AM CDT   Anticoagulation Visit with  ANTI COAGULATION   [facility]    [address] [facility] [address]   [phone]              May 2017 Details    Sun Mon Tue Wed Thu Fri Sat      1               2               3               4               5               6                 7               8               9               10               11               12               13                 14               15               16               17               18               19      7.5 mg   See details      20      7.5 mg           21      5 mg         22      7.5 mg         23      5 mg         24      7.5 mg         25      5 mg         26      7.5 mg         27      5 mg           28      5 mg         29      7.5 mg         30      5 mg         31                Date Details   05/19 This INR check       Date of next INR:  5/31/2017         How to take your warfarin dose     To take:  5 mg Take 1 of the 5 mg tablets.    To take:  7.5 mg Take 1.5 of the 5 mg tablets.

## 2017-05-31 ENCOUNTER — ANTICOAGULATION THERAPY VISIT (OUTPATIENT)
Dept: ANTICOAGULATION | Facility: OTHER | Age: 82
End: 2017-05-31
Attending: FAMILY MEDICINE
Payer: MEDICARE

## 2017-05-31 DIAGNOSIS — Z98.890 S/P CAROTID ENDARTERECTOMY: ICD-10-CM

## 2017-05-31 DIAGNOSIS — Z86.73 HISTORY OF STROKE: ICD-10-CM

## 2017-05-31 DIAGNOSIS — Z79.01 LONG-TERM (CURRENT) USE OF ANTICOAGULANTS: ICD-10-CM

## 2017-05-31 LAB — INR POINT OF CARE: 1.6 (ref 0.86–1.14)

## 2017-05-31 PROCEDURE — 85610 PROTHROMBIN TIME: CPT | Mod: QW,ZL

## 2017-05-31 NOTE — PROGRESS NOTES
ANTICOAGULATION FOLLOW-UP CLINIC VISIT    Patient Name:  Ricarda Bae  Date:  5/31/2017  Contact Type:  Face to Face    SUBJECTIVE:     Patient Findings     Positives No Problem Findings           OBJECTIVE    INR Protime   Date Value Ref Range Status   05/31/2017 1.6 (A) 0.86 - 1.14 Final       ASSESSMENT / PLAN  INR assessment THER    Recheck INR In: 10 DAYS    INR Location Clinic      Anticoagulation Summary as of 5/31/2017     INR goal 2.0-3.0   Today's INR 1.6!   Maintenance plan 7.5 mg (5 mg x 1.5) on Mon, Wed, Fri; 5 mg (5 mg x 1) all other days   Full instructions 5/31: 10 mg; 6/1: 7.5 mg; Otherwise 7.5 mg on Mon, Wed, Fri; 5 mg all other days   Weekly total 42.5 mg   Plan last modified Gisela Bateman RN (5/19/2017)   Next INR check 6/9/2017   Target end date Indefinite    Indications   A-fib (H) [I48.91]  S/P carotid endarterectomy [Z98.890]  History of stroke [Z86.73]  Long-term (current) use of anticoagulants [Z79.01] [Z79.01]         Anticoagulation Episode Summary     INR check location     Preferred lab     Send INR reminders to [facility] POOL    Comments       Anticoagulation Care Providers     Provider Role Specialty Phone number    Esau Jimenez MD Referring Clinical Cardiac Electrophysiology [phone]    Darien Guillory MD [facility] [phone]            See the Encounter Report to view Anticoagulation Flowsheet and Dosing Calendar (Go to Encounters tab in chart review, and find the Anticoagulation Therapy Visit)        Gisela Vidal RN

## 2017-05-31 NOTE — MR AVS SNAPSHOT
Ricarda Bae   5/31/2017 2:30 PM   Anticoagulation Therapy Visit    Description:  82 year old female   Provider:   ANTI COAGULATION   Department:  Hc Anti Coagulation           INR as of 5/31/2017     Today's INR 1.6!      Anticoagulation Summary as of 5/31/2017     INR goal 2.0-3.0   Today's INR 1.6!   Full instructions 5/31: 10 mg; 6/1: 7.5 mg; Otherwise 7.5 mg on Mon, Wed, Fri; 5 mg all other days   Next INR check 6/9/2017    Indications   A-fib (H) [I48.91]  S/P carotid endarterectomy [Z98.890]  History of stroke [Z86.73]  Long-term (current) use of anticoagulants [Z79.01] [Z79.01]         Your next Anticoagulation Clinic appointment(s)     Jun 09, 2017 11:00 AM CDT   Anticoagulation Visit with  ANTI COAGULATION   [facility]    [address] [facility] [place] [address]   [phone]              May 2017 Details    Sun Mon Tue Wed Thu Fri Sat      1               2               3               4               5               6                 7               8               9               10               11               12               13                 14               15               16               17               18               19               20                 21               22               23               24               25               26               27                 28               29               30               31      10 mg   See details          Date Details   05/31 This INR check               How to take your warfarin dose     To take:  10 mg Take 2 of the 5 mg tablets.           June 2017 Details    Sun Mon Tue Wed Thu Fri Sat         1      7.5 mg         2      7.5 mg         3      5 mg           4      5 mg         5      7.5 mg         6      5 mg         7      7.5 mg         8      5 mg         9            10                 11               12               13               14               15               16                17                 18               19               20               21               22               23               24                 25               26               27               28               29               30                 Date Details   No additional details    Date of next INR:  6/9/2017         How to take your warfarin dose     To take:  5 mg Take 1 of the 5 mg tablets.    To take:  7.5 mg Take 1.5 of the 5 mg tablets.

## 2017-06-01 DIAGNOSIS — Z79.01 LONG TERM CURRENT USE OF ANTICOAGULANT THERAPY: Primary | ICD-10-CM

## 2017-06-01 RX ORDER — WARFARIN SODIUM 5 MG/1
TABLET ORAL
Qty: 35 TABLET | Refills: 3 | Status: SHIPPED | OUTPATIENT
Start: 2017-06-01

## 2017-06-09 ENCOUNTER — ANTICOAGULATION THERAPY VISIT (OUTPATIENT)
Dept: ANTICOAGULATION | Facility: OTHER | Age: 82
DRG: 293 | End: 2017-06-09
Attending: FAMILY MEDICINE
Payer: MEDICARE

## 2017-06-09 DIAGNOSIS — Z79.01 LONG-TERM (CURRENT) USE OF ANTICOAGULANTS: ICD-10-CM

## 2017-06-09 DIAGNOSIS — Z98.890 S/P CAROTID ENDARTERECTOMY: ICD-10-CM

## 2017-06-09 DIAGNOSIS — Z86.73 HISTORY OF STROKE: ICD-10-CM

## 2017-06-09 LAB — INR POINT OF CARE: 3.7 (ref 0.86–1.14)

## 2017-06-09 NOTE — MR AVS SNAPSHOT
Ricarda Bae   6/9/2017 11:00 AM   Anticoagulation Therapy Visit    Description:  82 year old female   Provider:   ANTI COAGULATION   Department:  Hc Anti Coagulation           INR as of 6/9/2017     Today's INR 3.7!      Anticoagulation Summary as of 6/9/2017     INR goal 2.0-3.0   Today's INR 3.7!   Full instructions 6/9: 2.5 mg; Otherwise 7.5 mg on Mon, Wed, Fri; 5 mg all other days   Next INR check 6/19/2017    Indications   A-fib (H) [I48.91]  S/P carotid endarterectomy [Z98.890]  History of stroke [Z86.73]  Long-term (current) use of anticoagulants [Z79.01] [Z79.01]         Your next Anticoagulation Clinic appointment(s)     Jun 19, 2017 11:00 AM CDT   Anticoagulation Visit with  ANTI COAGULATION   [facility]    [address] [facility] [place] [address]   [phone]              June 2017 Details    Sun Mon Tue Wed Thu Fri Sat         1               2               3                 4               5               6               7               8               9      2.5 mg   See details      10      5 mg           11      5 mg         12      7.5 mg         13      5 mg         14      7.5 mg         15      5 mg         16      7.5 mg         17      5 mg           18      5 mg         19            20               21               22               23               24                 25               26               27               28               29               30                 Date Details   06/09 This INR check       Date of next INR:  6/19/2017         How to take your warfarin dose     To take:  2.5 mg Take 1 of the 2.5 mg tablets.    To take:  5 mg Take 1 of the 5 mg tablets.    To take:  7.5 mg Take 1.5 of the 5 mg tablets.

## 2017-06-09 NOTE — PROGRESS NOTES
ANTICOAGULATION FOLLOW-UP CLINIC VISIT    Patient Name:  Ricarda Bae  Date:  6/9/2017  Contact Type:  Face to Face    SUBJECTIVE:     Patient Findings     Comments Increased ankle edema. Patient also has some open areas on lower legs from scratching legs           OBJECTIVE    INR Protime   Date Value Ref Range Status   06/09/2017 3.7 (A) 0.86 - 1.14 Final       ASSESSMENT / PLAN  INR assessment SUPRA    Recheck INR In: 10 DAYS    INR Location Clinic      Anticoagulation Summary as of 6/9/2017     INR goal 2.0-3.0   Today's INR 3.7!   Maintenance plan 7.5 mg (5 mg x 1.5) on Mon, Wed, Fri; 5 mg (5 mg x 1) all other days   Full instructions 6/9: 2.5 mg; Otherwise 7.5 mg on Mon, Wed, Fri; 5 mg all other days   Weekly total 42.5 mg   Plan last modified Gisela Bateman RN (5/19/2017)   Next INR check 6/19/2017   Target end date Indefinite    Indications   A-fib (H) [I48.91]  S/P carotid endarterectomy [Z98.890]  History of stroke [Z86.73]  Long-term (current) use of anticoagulants [Z79.01] [Z79.01]         Anticoagulation Episode Summary     INR check location     Preferred lab     Send INR reminders to  ANTICOAG POOL    Comments       Anticoagulation Care Providers     Provider Role Specialty Phone number    Esau Jimenez MD Referring Clinical Cardiac Electrophysiology [phone]    Darien Guillory MD [facility] [phone]            See the Encounter Report to view Anticoagulation Flowsheet and Dosing Calendar (Go to Encounters tab in chart review, and find the Anticoagulation Therapy Visit)        Gisela Vidal RN

## 2017-06-10 ENCOUNTER — HOSPITAL ENCOUNTER (INPATIENT)
Facility: HOSPITAL | Age: 82
LOS: 3 days | Discharge: HOSPICE | DRG: 293 | End: 2017-06-13
Attending: FAMILY MEDICINE | Admitting: FAMILY MEDICINE
Payer: MEDICARE

## 2017-06-10 DIAGNOSIS — M54.6 ACUTE BILATERAL THORACIC BACK PAIN: ICD-10-CM

## 2017-06-10 DIAGNOSIS — Z51.5 HOSPICE CARE PATIENT: ICD-10-CM

## 2017-06-10 DIAGNOSIS — I50.21 ACUTE SYSTOLIC CONGESTIVE HEART FAILURE (H): ICD-10-CM

## 2017-06-10 DIAGNOSIS — R60.9 EDEMA, UNSPECIFIED TYPE: ICD-10-CM

## 2017-06-10 DIAGNOSIS — I48.20 CHRONIC ATRIAL FIBRILLATION (H): Primary | ICD-10-CM

## 2017-06-10 DIAGNOSIS — Z86.73 HISTORY OF STROKE: ICD-10-CM

## 2017-06-10 DIAGNOSIS — R09.02 HYPOXIA: ICD-10-CM

## 2017-06-10 PROBLEM — I50.33: Status: ACTIVE | Noted: 2017-06-10

## 2017-06-10 PROBLEM — I50.82: Status: ACTIVE | Noted: 2017-06-10

## 2017-06-10 LAB
ALBUMIN SERPL-MCNC: 2.8 G/DL (ref 3.4–5)
ALBUMIN UR-MCNC: NEGATIVE MG/DL
ALP SERPL-CCNC: 139 U/L (ref 40–150)
ALT SERPL W P-5'-P-CCNC: 28 U/L (ref 0–50)
ANION GAP SERPL CALCULATED.3IONS-SCNC: 5 MMOL/L (ref 3–14)
ANISOCYTOSIS BLD QL SMEAR: ABNORMAL
APPEARANCE UR: CLEAR
AST SERPL W P-5'-P-CCNC: 32 U/L (ref 0–45)
BASOPHILS # BLD AUTO: 0 10E9/L (ref 0–0.2)
BASOPHILS NFR BLD AUTO: 0.1 %
BILIRUB SERPL-MCNC: 0.8 MG/DL (ref 0.2–1.3)
BILIRUB UR QL STRIP: NEGATIVE
BUN SERPL-MCNC: 21 MG/DL (ref 7–30)
CALCIUM SERPL-MCNC: 8.5 MG/DL (ref 8.5–10.1)
CHLORIDE SERPL-SCNC: 104 MMOL/L (ref 94–109)
CO2 SERPL-SCNC: 33 MMOL/L (ref 20–32)
COLOR UR AUTO: NORMAL
CREAT SERPL-MCNC: 0.73 MG/DL (ref 0.52–1.04)
CRP SERPL-MCNC: 5 MG/L (ref 0–8)
D DIMER PPP DDU-MCNC: >5250 NG/ML D-DU (ref 0–300)
DIFFERENTIAL METHOD BLD: ABNORMAL
EOSINOPHIL # BLD AUTO: 0.1 10E9/L (ref 0–0.7)
EOSINOPHIL NFR BLD AUTO: 1.5 %
ERYTHROCYTE [DISTWIDTH] IN BLOOD BY AUTOMATED COUNT: 21.6 % (ref 10–15)
GFR SERPL CREATININE-BSD FRML MDRD: 77 ML/MIN/1.7M2
GLUCOSE SERPL-MCNC: 113 MG/DL (ref 70–99)
GLUCOSE UR STRIP-MCNC: NEGATIVE MG/DL
HCT VFR BLD AUTO: 38.4 % (ref 35–47)
HGB BLD-MCNC: 11.1 G/DL (ref 11.7–15.7)
HGB UR QL STRIP: NEGATIVE
HYPOCHROMIA BLD QL: PRESENT
IMM GRANULOCYTES # BLD: 0.1 10E9/L (ref 0–0.4)
IMM GRANULOCYTES NFR BLD: 1.3 %
KETONES UR STRIP-MCNC: NEGATIVE MG/DL
LACTATE SERPL-SCNC: 1.7 MMOL/L (ref 0.4–2)
LEUKOCYTE ESTERASE UR QL STRIP: NEGATIVE
LYMPHOCYTES # BLD AUTO: 0.7 10E9/L (ref 0.8–5.3)
LYMPHOCYTES NFR BLD AUTO: 9.4 %
MAGNESIUM SERPL-MCNC: 1.9 MG/DL (ref 1.6–2.3)
MCH RBC QN AUTO: 22.5 PG (ref 26.5–33)
MCHC RBC AUTO-ENTMCNC: 28.9 G/DL (ref 31.5–36.5)
MCV RBC AUTO: 78 FL (ref 78–100)
MICROCYTES BLD QL SMEAR: PRESENT
MONOCYTES # BLD AUTO: 0.5 10E9/L (ref 0–1.3)
MONOCYTES NFR BLD AUTO: 7.3 %
NEUTROPHILS # BLD AUTO: 5.8 10E9/L (ref 1.6–8.3)
NEUTROPHILS NFR BLD AUTO: 80.4 %
NITRATE UR QL: NEGATIVE
NRBC # BLD AUTO: 0 10*3/UL
NRBC BLD AUTO-RTO: 0 /100
NT-PROBNP SERPL-MCNC: ABNORMAL PG/ML (ref 0–1800)
PH UR STRIP: 6 PH (ref 4.7–8)
PLATELET # BLD AUTO: 182 10E9/L (ref 150–450)
POTASSIUM SERPL-SCNC: 3.6 MMOL/L (ref 3.4–5.3)
PROT SERPL-MCNC: 6 G/DL (ref 6.8–8.8)
RBC # BLD AUTO: 4.94 10E12/L (ref 3.8–5.2)
SODIUM SERPL-SCNC: 142 MMOL/L (ref 133–144)
SP GR UR STRIP: 1 (ref 1–1.03)
TROPONIN I SERPL-MCNC: 0.03 UG/L (ref 0–0.04)
TSH SERPL DL<=0.05 MIU/L-ACNC: 3.81 MU/L (ref 0.4–4)
URN SPEC COLLECT METH UR: NORMAL
UROBILINOGEN UR STRIP-MCNC: NORMAL MG/DL (ref 0–2)
WBC # BLD AUTO: 7.2 10E9/L (ref 4–11)

## 2017-06-10 PROCEDURE — 84484 ASSAY OF TROPONIN QUANT: CPT | Performed by: FAMILY MEDICINE

## 2017-06-10 PROCEDURE — 71020 ZZHC CHEST TWO VIEWS, FRONT/LAT: CPT | Mod: TC

## 2017-06-10 PROCEDURE — 86140 C-REACTIVE PROTEIN: CPT | Performed by: FAMILY MEDICINE

## 2017-06-10 PROCEDURE — 72125 CT NECK SPINE W/O DYE: CPT | Mod: TC

## 2017-06-10 PROCEDURE — 80053 COMPREHEN METABOLIC PANEL: CPT | Performed by: FAMILY MEDICINE

## 2017-06-10 PROCEDURE — 96374 THER/PROPH/DIAG INJ IV PUSH: CPT

## 2017-06-10 PROCEDURE — 96376 TX/PRO/DX INJ SAME DRUG ADON: CPT

## 2017-06-10 PROCEDURE — 99285 EMERGENCY DEPT VISIT HI MDM: CPT | Mod: 25

## 2017-06-10 PROCEDURE — 71275 CT ANGIOGRAPHY CHEST: CPT | Mod: TC

## 2017-06-10 PROCEDURE — 85379 FIBRIN DEGRADATION QUANT: CPT | Performed by: FAMILY MEDICINE

## 2017-06-10 PROCEDURE — 94640 AIRWAY INHALATION TREATMENT: CPT

## 2017-06-10 PROCEDURE — 83735 ASSAY OF MAGNESIUM: CPT | Performed by: FAMILY MEDICINE

## 2017-06-10 PROCEDURE — 93010 ELECTROCARDIOGRAM REPORT: CPT | Performed by: INTERNAL MEDICINE

## 2017-06-10 PROCEDURE — 83605 ASSAY OF LACTIC ACID: CPT | Performed by: FAMILY MEDICINE

## 2017-06-10 PROCEDURE — 72128 CT CHEST SPINE W/O DYE: CPT | Mod: TC

## 2017-06-10 PROCEDURE — 25000128 H RX IP 250 OP 636: Performed by: FAMILY MEDICINE

## 2017-06-10 PROCEDURE — 72131 CT LUMBAR SPINE W/O DYE: CPT | Mod: TC

## 2017-06-10 PROCEDURE — 40000275 ZZH STATISTIC RCP TIME EA 10 MIN

## 2017-06-10 PROCEDURE — 25000125 ZZHC RX 250: Performed by: FAMILY MEDICINE

## 2017-06-10 PROCEDURE — 85025 COMPLETE CBC W/AUTO DIFF WBC: CPT | Performed by: FAMILY MEDICINE

## 2017-06-10 PROCEDURE — 70450 CT HEAD/BRAIN W/O DYE: CPT | Mod: TC

## 2017-06-10 PROCEDURE — 93005 ELECTROCARDIOGRAM TRACING: CPT

## 2017-06-10 PROCEDURE — 83880 ASSAY OF NATRIURETIC PEPTIDE: CPT | Performed by: FAMILY MEDICINE

## 2017-06-10 PROCEDURE — 96375 TX/PRO/DX INJ NEW DRUG ADDON: CPT

## 2017-06-10 PROCEDURE — 12000000 ZZH R&B MED SURG/OB

## 2017-06-10 PROCEDURE — 99285 EMERGENCY DEPT VISIT HI MDM: CPT | Performed by: FAMILY MEDICINE

## 2017-06-10 PROCEDURE — 81003 URINALYSIS AUTO W/O SCOPE: CPT | Performed by: FAMILY MEDICINE

## 2017-06-10 PROCEDURE — 84443 ASSAY THYROID STIM HORMONE: CPT | Performed by: FAMILY MEDICINE

## 2017-06-10 RX ORDER — WARFARIN SODIUM 5 MG/1
5 TABLET ORAL DAILY
Status: DISCONTINUED | OUTPATIENT
Start: 2017-06-11 | End: 2017-06-10

## 2017-06-10 RX ORDER — BUPROPION HYDROCHLORIDE 300 MG/1
300 TABLET ORAL DAILY
Status: DISCONTINUED | OUTPATIENT
Start: 2017-06-11 | End: 2017-06-13 | Stop reason: HOSPADM

## 2017-06-10 RX ORDER — FENTANYL CITRATE 50 UG/ML
50 INJECTION, SOLUTION INTRAMUSCULAR; INTRAVENOUS
Status: DISCONTINUED | OUTPATIENT
Start: 2017-06-10 | End: 2017-06-13 | Stop reason: HOSPADM

## 2017-06-10 RX ORDER — ONDANSETRON 4 MG/1
8 TABLET, ORALLY DISINTEGRATING ORAL EVERY 6 HOURS PRN
Status: DISCONTINUED | OUTPATIENT
Start: 2017-06-10 | End: 2017-06-13 | Stop reason: HOSPADM

## 2017-06-10 RX ORDER — NALOXONE HYDROCHLORIDE 0.4 MG/ML
.1-.4 INJECTION, SOLUTION INTRAMUSCULAR; INTRAVENOUS; SUBCUTANEOUS
Status: DISCONTINUED | OUTPATIENT
Start: 2017-06-10 | End: 2017-06-13 | Stop reason: HOSPADM

## 2017-06-10 RX ORDER — SODIUM CHLORIDE 9 MG/ML
INJECTION, SOLUTION INTRAVENOUS CONTINUOUS
Status: DISCONTINUED | OUTPATIENT
Start: 2017-06-10 | End: 2017-06-11

## 2017-06-10 RX ORDER — OXYCODONE HYDROCHLORIDE 5 MG/1
5-10 TABLET ORAL
Status: DISCONTINUED | OUTPATIENT
Start: 2017-06-10 | End: 2017-06-13 | Stop reason: HOSPADM

## 2017-06-10 RX ORDER — HYDROCODONE BITARTRATE AND ACETAMINOPHEN 5; 325 MG/1; MG/1
1 TABLET ORAL EVERY 6 HOURS PRN
Status: DISCONTINUED | OUTPATIENT
Start: 2017-06-10 | End: 2017-06-13 | Stop reason: HOSPADM

## 2017-06-10 RX ORDER — HYDROCHLOROTHIAZIDE 25 MG/1
25 TABLET ORAL DAILY
Status: DISCONTINUED | OUTPATIENT
Start: 2017-06-11 | End: 2017-06-13 | Stop reason: HOSPADM

## 2017-06-10 RX ORDER — METOPROLOL TARTRATE 50 MG
50 TABLET ORAL 2 TIMES DAILY
Status: DISCONTINUED | OUTPATIENT
Start: 2017-06-10 | End: 2017-06-13 | Stop reason: HOSPADM

## 2017-06-10 RX ORDER — FENTANYL CITRATE 50 UG/ML
50 INJECTION, SOLUTION INTRAMUSCULAR; INTRAVENOUS ONCE
Status: COMPLETED | OUTPATIENT
Start: 2017-06-10 | End: 2017-06-10

## 2017-06-10 RX ORDER — METOPROLOL TARTRATE 50 MG
50 TABLET ORAL 2 TIMES DAILY
Status: DISCONTINUED | OUTPATIENT
Start: 2017-06-11 | End: 2017-06-10

## 2017-06-10 RX ORDER — ACETAMINOPHEN 325 MG/1
650 TABLET ORAL EVERY 4 HOURS PRN
Status: DISCONTINUED | OUTPATIENT
Start: 2017-06-10 | End: 2017-06-13 | Stop reason: HOSPADM

## 2017-06-10 RX ORDER — SACCHAROMYCES BOULARDII 250 MG
250 CAPSULE ORAL 2 TIMES DAILY
Status: DISCONTINUED | OUTPATIENT
Start: 2017-06-10 | End: 2017-06-13 | Stop reason: HOSPADM

## 2017-06-10 RX ORDER — METOPROLOL TARTRATE 100 MG
100 TABLET ORAL 2 TIMES DAILY
Status: DISCONTINUED | OUTPATIENT
Start: 2017-06-10 | End: 2017-06-10

## 2017-06-10 RX ORDER — IPRATROPIUM BROMIDE AND ALBUTEROL SULFATE 2.5; .5 MG/3ML; MG/3ML
3 SOLUTION RESPIRATORY (INHALATION) EVERY 4 HOURS PRN
Status: DISCONTINUED | OUTPATIENT
Start: 2017-06-10 | End: 2017-06-13 | Stop reason: HOSPADM

## 2017-06-10 RX ORDER — FUROSEMIDE 10 MG/ML
40 INJECTION INTRAMUSCULAR; INTRAVENOUS ONCE
Status: COMPLETED | OUTPATIENT
Start: 2017-06-10 | End: 2017-06-10

## 2017-06-10 RX ORDER — ASPIRIN 81 MG/1
81 TABLET, CHEWABLE ORAL DAILY
Status: DISCONTINUED | OUTPATIENT
Start: 2017-06-11 | End: 2017-06-13 | Stop reason: HOSPADM

## 2017-06-10 RX ORDER — FUROSEMIDE 10 MG/ML
40 INJECTION INTRAMUSCULAR; INTRAVENOUS ONCE
Status: COMPLETED | OUTPATIENT
Start: 2017-06-11 | End: 2017-06-11

## 2017-06-10 RX ORDER — FUROSEMIDE 40 MG
40 TABLET ORAL DAILY
Status: DISCONTINUED | OUTPATIENT
Start: 2017-06-11 | End: 2017-06-13 | Stop reason: HOSPADM

## 2017-06-10 RX ORDER — IOPAMIDOL 755 MG/ML
75 INJECTION, SOLUTION INTRAVASCULAR ONCE
Status: COMPLETED | OUTPATIENT
Start: 2017-06-10 | End: 2017-06-10

## 2017-06-10 RX ORDER — WARFARIN SODIUM 5 MG/1
5 TABLET ORAL
Status: DISCONTINUED | OUTPATIENT
Start: 2017-06-10 | End: 2017-06-10

## 2017-06-10 RX ORDER — BISACODYL 10 MG
10 SUPPOSITORY, RECTAL RECTAL DAILY PRN
Status: DISCONTINUED | OUTPATIENT
Start: 2017-06-10 | End: 2017-06-13 | Stop reason: HOSPADM

## 2017-06-10 RX ORDER — POTASSIUM CHLORIDE 1500 MG/1
40 TABLET, EXTENDED RELEASE ORAL 2 TIMES DAILY
Status: DISCONTINUED | OUTPATIENT
Start: 2017-06-10 | End: 2017-06-13 | Stop reason: HOSPADM

## 2017-06-10 RX ORDER — IPRATROPIUM BROMIDE AND ALBUTEROL SULFATE 2.5; .5 MG/3ML; MG/3ML
3 SOLUTION RESPIRATORY (INHALATION) ONCE
Status: COMPLETED | OUTPATIENT
Start: 2017-06-10 | End: 2017-06-10

## 2017-06-10 RX ADMIN — IPRATROPIUM BROMIDE AND ALBUTEROL SULFATE 3 ML: .5; 3 SOLUTION RESPIRATORY (INHALATION) at 19:33

## 2017-06-10 RX ADMIN — SODIUM CHLORIDE, PRESERVATIVE FREE: 5 INJECTION INTRAVENOUS at 23:55

## 2017-06-10 RX ADMIN — FUROSEMIDE 40 MG: 10 INJECTION, SOLUTION INTRAVENOUS at 19:41

## 2017-06-10 RX ADMIN — FENTANYL CITRATE 50 MCG: 50 INJECTION, SOLUTION INTRAMUSCULAR; INTRAVENOUS at 20:42

## 2017-06-10 RX ADMIN — IOPAMIDOL 75 ML: 755 INJECTION, SOLUTION INTRAVASCULAR at 21:23

## 2017-06-10 RX ADMIN — FENTANYL CITRATE 50 MCG: 50 INJECTION, SOLUTION INTRAMUSCULAR; INTRAVENOUS at 19:41

## 2017-06-10 ASSESSMENT — ENCOUNTER SYMPTOMS
DIARRHEA: 0
CHEST TIGHTNESS: 0
PALPITATIONS: 1
WHEEZING: 0
FREQUENCY: 1
ABDOMINAL DISTENTION: 0
ACTIVITY CHANGE: 1
CONSTIPATION: 0
UNEXPECTED WEIGHT CHANGE: 1
FATIGUE: 0
BACK PAIN: 1
SHORTNESS OF BREATH: 0
FACIAL SWELLING: 0
COUGH: 0
DYSPHORIC MOOD: 0
NERVOUS/ANXIOUS: 0
VOMITING: 0
WOUND: 1
ABDOMINAL PAIN: 0
HEADACHES: 0
DYSURIA: 0
NECK PAIN: 0
NUMBNESS: 0
JOINT SWELLING: 0
FEVER: 0
NAUSEA: 0
LIGHT-HEADEDNESS: 0
DIAPHORESIS: 0
WEAKNESS: 1

## 2017-06-10 ASSESSMENT — PAIN DESCRIPTION - DESCRIPTORS: DESCRIPTORS: DISCOMFORT

## 2017-06-10 NOTE — IP AVS SNAPSHOT
MRN:[id number]                      After Visit Summary   6/10/2017    Ricarda Bae    MRN: [id number]           Thank you!     Thank you for choosing [facility] for your care. Our goal is always to provide you with excellent care. Hearing back from our patients is one way we can continue to improve our services. Please take a few minutes to complete the written survey that you may receive in the mail after you visit with us. Thank you!        Patient Information     Date Of Birth          1935        Designated Caregiver       Most Recent Value    Caregiver    Will someone help with your care after discharge? yes    Name of designated caregiver Aleksandar     Phone number of caregiver [phone]    Caregiver address [place]       About your hospital stay     You were admitted on:  Nicole 10, 2017 You last received care in the:  [facility]    You were discharged on:  June 13, 2017        Reason for your hospital stay       Patient is an 82 year old female who was admitted with weakness after a fall at home. She was brought to the emergency room where she was seen and evaluated. Her evaluation revealed no acute fractures but she was weak and unable to return to home. She had increased heart failure with a BNP of greater than 13,000. She was also hypoxic and was subsequently treated with supplemental oxygen and was diuresed. Clinically she improved but continued to have low dose oxygen requirements. Her BNP improved to just under 7,000 and her chronic atrial fibrillation remained rate controlled. She remained therapeutically anticoagulated during her stay. She was discharged to home with additional services including palliative care, nursing and oxygen.                  Who to Call     For medical emergencies, please call 911.  For non-urgent questions about your medical care, please call your primary care provider or clinic, [phone]          Attending Provider     Provider  Specialty    Yasmin Wise MD Emergency Medicine    Darien Guillory MD [facility]       Primary Care Provider Office Phone # Fax #    Darien Guillory [phone]      After Care Instructions     Activity       Your activity upon discharge: activity as tolerated            Diet       Follow this diet upon discharge:       Combination Diet Regular Diet Adult            Oxygen Adult       [place] Oxygen Order 1 liter(s) by nasal cannula continuously with use of portable tank. Expected treatment length is indefinite (99 months).. Test on conserving device as applicable.    Patients who qualify for home O2 coverage under the CMS guidelines require ABG tests or O2 sat readings obtained closest to, but no earlier than 2 days prior to the discharge, as evidence of the need for home oxygen therapy. Testing must be performed while patient is in the chronic stable state. See notes for O2 sats.    I certify that this patient, Ricarda Bae has been under my care and that I, or a nurse practitioner or physician's assistant working with me, had a face-to-face encounter that meets the face-to-face encounter requirements with this patient on 6/13/2017. The patient, Ricarda Bae was evaluated or treated in whole, or in part, for the following medical condition, which necessitates the use of the ordered oxygen. Treatment Diagnosis: acute on chronic right sided congestive heart failure, chronic atrial fibrillation    Attending Provider: Darien Guillory  Physician signature: See electronic signature associated with these discharge orders  Date of Order: June 13, 2017                  Follow-up Appointments     Follow-up and recommended labs and tests        Follow up with primary care provider, Darien Guillory, within 7 days for hospital follow- up.  No follow up labs or test are needed.                  Your next 10 appointments already scheduled     Jun 19, 2017 11:00 AM CDT   Anticoagulation  Visit with HC ANTI COAGULATION   [facility] [place] ([facility] [place] )    [address]  [place] [address]   [phone]            Oct 10, 2017  1:00 PM CDT   (Arrive by 12:45 PM)   New Visit with HC PACEMAKER   [facility] [place] ([facility] [place] )    [address]  [place] [address]   [phone]              Additional Services     Home Care PT Referral for Hospital Discharge       PT to eval and treat    Your provider has ordered home care - physical therapy. If you have not been contacted within 2 days of your discharge please call the department phone number listed on the top of this document.            Home Care Social Service Referral for Hospital Discharge        to assist with ongoing and progressive needs    Your provider has ordered home care - . If you have not been contacted within 2 days of your discharge please call the department phone number listed on the top of this document.            Home care nursing referral       RN skilled nursing visit. RN to assess vital signs and weight, respiratory and cardiac status, pain level and activity tolerance and home safety.  RN to teach medication management.  Home health aide to assist with ADL's    Your provider has ordered home care nursing services. If you have not been contacted within 2 days of your discharge please call the inpatient department phone number at [phone] .                  Further instructions from your care team        AN APPOINTMENT HAS BEEN SCHEDULED FOR YOU ON Thursday June 22ND AT 1000 AM WITH DR. SWARTZ. IF THIS APPOINTMENT DOES NOT WORK FOR YOU PLEASE CALL [phone] TO RESCHEDULE. THANKS    General Recommendations To Control Heart Failure When You Get Home     Instructions To Patients and Families: Please read and check off each of these important instructions as you do them when you get home.           Weight and symptoms      ___ Put a scale  "in your bathroom  ___ Post a weight chart or calendar next to the scale  ___Weigh yourself every day as soon as you get up in the morning. You should only be wearing your pajamas. Write your weight on the chart/calendar.  ___ Bring your weight chart/calendar with you to all appointments    ___Call your doctor if you gain 2 pounds in 1 day or 5 pounds in 1 week from your \"dry\" weight (baseline weight). Also call your doctor if you have shortness of breath that gets worse over time, leg swelling or fatigue.         Medicines and diet     ___ Make sure to take your medicines as prescribed.    ___Bring a current list of your medicines and all of your medicine bottles with you to all appointments.    ___ Limit fluids if you still have swelling or shortness of breath, or if your doctor tells you to do so.  ___ Eat less than 2000 mg of sodium (salt) every day. Read food labels, and do not add salt to meals.   ___ Heart healthy diet with low fat and low cholesterol          Activity and suggested lifestyle changes    ___ Stay active. Talk to your doctor about an exercise program that is safe for your heart.    ___ Stop smoking. Reduce alcohol use.      ___ Lose weight if you are overweight. Extra weight puts a lot of stress on the heart.          Control for Leg Swelling   ___ Keep your legs elevated (raised) as needed for swelling. If swelling is uncomfortable or elevation doesn t help, ask your doctor about using ACE wrap or Jobst stockings.          Follow-up appointments   ____ Follow up with your doctor within 7-10 days after discharge.    ___ Make sure to take your medications as prescribed and bring an accurate list of your medications and your weight chart/calendar to your follow up appointment.           Pending Results     No orders found from 6/8/2017 to 6/11/2017.            Statement of Approval     Ordered          06/13/17 0805  I have reviewed and agree with all the recommendations and orders detailed in " "this document.  EFFECTIVE NOW     Approved and electronically signed by:  Darien Guillory MD             Admission Information     Date & Time Provider Department Dept. Phone    6/10/2017 Darien Guillory MD [facility] [phone]      Your Vitals Were     Blood Pressure Pulse Temperature Respirations Height Weight    119/58 (BP Location: Right arm) 65 96.8  F (36  C) (Tympanic) 16 1.727 m (5' 8\") 61.3 kg (135 lb 2.3 oz)    Pulse Oximetry BMI (Body Mass Index)                88% 20.55 kg/m2          [employer] Information     [employer] lets you send messages to your doctor, view your test results, renew your prescriptions, schedule appointments and more. To sign up, go to [email or web address] . Click on \"Log in\" on the left side of the screen, which will take you to the Welcome page. Then click on \"Sign up Now\" on the right side of the page.     You will be asked to enter the access code listed below, as well as some personal information. Please follow the directions to create your username and password.     Your access code is: [email or web address]  Expires: 7/10/2017  9:18 PM     Your access code will  in 90 days. If you need help or a new code, please call your [facility] clinic or [phone].        Care EveryWhere ID     This is your Care EveryWhere ID. This could be used by other organizations to access your [facility] medical records  [id number]           Review of your medicines      CONTINUE these medicines which may have CHANGED, or have new prescriptions. If we are uncertain of the size of tablets/capsules you have at home, strength may be listed as something that might have changed.        Dose / Directions    metoprolol 100 MG tablet   Commonly known as:  LOPRESSOR   This may have changed:  how much to take   Used for:  Hypertension        Dose:  100 mg   Take 1 tablet (100 mg) by mouth 2 times daily   Quantity:  60 tablet   Refills:  11         CONTINUE these medicines which have NOT CHANGED     "    Dose / Directions    ASPIRIN PO        Dose:  81 mg   Take 81 mg by mouth daily   Refills:  0       diphenoxylate-atropine 2.5-0.025 MG per tablet   Commonly known as:  LOMOTIL        TAKE 1 TABLET BY MOUTH two times DAILY AS NEEDED   Refills:  0       FLORASTOR 250 MG capsule   Generic drug:  saccharomyces boulardii        TAKE 1 CAPSULE BY MOUTH AT BEDTIME   Refills:  11       FUROSEMIDE PO        Dose:  40 mg   Take 40 mg by mouth daily   Refills:  0       HYDROCHLOROTHIAZIDE PO        Dose:  25 mg   Take 25 mg by mouth daily   Refills:  0       HYDROcodone-acetaminophen 5-325 MG per tablet   Commonly known as:  NORCO   Used for:  Intussusception of colon (H)        Dose:  1 tablet   Take 1 tablet by mouth every 6 hours as needed for moderate to severe pain   Quantity:  15 tablet   Refills:  0       hypromellose 0.4 % Soln ophthalmic solution   Commonly known as:  ARTIFICIAL TEARS   Used for:  Dry eyes, bilateral        Dose:  1 drop   Place 1 drop into both eyes every hour as needed for dry eyes   Quantity:  1 Bottle   Refills:  0       magnesium hydroxide 400 MG/5ML suspension   Commonly known as:  MILK OF MAGNESIA        Dose:  5 mL   Take 5 mLs by mouth daily as needed for constipation or heartburn   Refills:  0       MELATONIN PO        Dose:  10 mg   Take 10 mg by mouth At Bedtime   Refills:  0       ondansetron 4 MG ODT tab   Commonly known as:  ZOFRAN-ODT        DISSOLVE 1 TABLET ON TONGUE EVERY 6 HOURS AS NEEDED FOR NAUSEA   Refills:  11       psyllium 0.52 G capsule        Dose:  1 capsule   Take 1 capsule by mouth daily   Refills:  0       * warfarin 2.5 MG tablet   Commonly known as:  COUMADIN        Take as directed by [facility]   Quantity:  30 tablet   Refills:  11       * warfarin 5 MG tablet   Commonly known as:  COUMADIN   Used for:  Long-term (current) use of anticoagulants, S/P carotid endarterectomy, History of stroke        Dose:  5 mg   Take 5 mg by mouth daily Take 7.5mg Mon,  Wed, Fri; 5 mg all other days or as directed by warfarin clinic   Quantity:  30 tablet   Refills:  3       * warfarin 5 MG tablet   Commonly known as:  COUMADIN   Used for:  Long term current use of anticoagulant therapy        7.5mg Mon,Wed,Fri and 5mg all other days or as directed by warfarin clinic   Quantity:  35 tablet   Refills:  3       WELLBUTRIN XL PO        Dose:  300 mg   Take 300 mg by mouth daily   Refills:  0       * Notice:  This list has 3 medication(s) that are the same as other medications prescribed for you. Read the directions carefully, and ask your doctor or other care provider to review them with you.             Protect others around you: Learn how to safely use, store and throw away your medicines at www.disposemymeds.org.             Medication List: This is a list of all your medications and when to take them. Check marks below indicate your daily home schedule. Keep this list as a reference.      Medications           Morning Afternoon Evening Bedtime As Needed    ASPIRIN PO   Take 81 mg by mouth daily   Last time this was given:  81 mg on 6/13/2017  8:47 AM                                diphenoxylate-atropine 2.5-0.025 MG per tablet   Commonly known as:  LOMOTIL   TAKE 1 TABLET BY MOUTH two times DAILY AS NEEDED                                FLORASTOR 250 MG capsule   TAKE 1 CAPSULE BY MOUTH AT BEDTIME   Last time this was given:  250 mg on 6/13/2017  8:47 AM   Generic drug:  saccharomyces boulardii                                FUROSEMIDE PO   Take 40 mg by mouth daily   Last time this was given:  40 mg on 6/13/2017  8:47 AM                                HYDROCHLOROTHIAZIDE PO   Take 25 mg by mouth daily   Last time this was given:  25 mg on 6/13/2017  8:47 AM                                HYDROcodone-acetaminophen 5-325 MG per tablet   Commonly known as:  NORCO   Take 1 tablet by mouth every 6 hours as needed for moderate to severe pain   Last time this was given:  1 tablet on  6/13/2017  5:19 AM                                hypromellose 0.4 % Soln ophthalmic solution   Commonly known as:  ARTIFICIAL TEARS   Place 1 drop into both eyes every hour as needed for dry eyes                                magnesium hydroxide 400 MG/5ML suspension   Commonly known as:  MILK OF MAGNESIA   Take 5 mLs by mouth daily as needed for constipation or heartburn                                MELATONIN PO   Take 10 mg by mouth At Bedtime   Last time this was given:  10 mg on 6/12/2017  8:49 PM                                metoprolol 100 MG tablet   Commonly known as:  LOPRESSOR   Take 1 tablet (100 mg) by mouth 2 times daily   Last time this was given:  50 mg on 6/13/2017  8:47 AM                                ondansetron 4 MG ODT tab   Commonly known as:  ZOFRAN-ODT   DISSOLVE 1 TABLET ON TONGUE EVERY 6 HOURS AS NEEDED FOR NAUSEA                                psyllium 0.52 G capsule   Take 1 capsule by mouth daily   Last time this was given:  0.52 g on 6/13/2017  8:47 AM                                * warfarin 2.5 MG tablet   Commonly known as:  COUMADIN   Take as directed by [facility]   Last time this was given:  7.5 mg on 6/12/2017  6:08 PM                                * warfarin 5 MG tablet   Commonly known as:  COUMADIN   Take 5 mg by mouth daily Take 7.5mg Mon, Wed, Fri; 5 mg all other days or as directed by warfarin clinic   Last time this was given:  7.5 mg on 6/12/2017  6:08 PM                                * warfarin 5 MG tablet   Commonly known as:  COUMADIN   7.5mg Mon,Wed,Fri and 5mg all other days or as directed by warfarin clinic   Last time this was given:  7.5 mg on 6/12/2017  6:08 PM                                WELLBUTRIN XL PO   Take 300 mg by mouth daily   Last time this was given:  300 mg on 6/13/2017  8:47 AM                                * Notice:  This list has 3 medication(s) that are the same as other medications prescribed for you. Read the directions  carefully, and ask your doctor or other care provider to review them with you.              More Information        Left- or Right- Side Congestive Heart Failure    The heart is a large muscle. It is a pump that circulates blood throughout the body. Blood carries oxygen to all of the organs, including the brain, muscles, and skin. After your body takes the oxygen out of the blood, the blood returns to the heart. The right side of the heart collects the blood from the body and pumps it to the lungs. In the lungs, it gets fresh oxygen and gives up carbon dioxide. The oxygen-rich blood from the lungs then returns to the left side of the heart, where it is pumped back out to the rest of your body, starting the process all over.  Congestive heart failure (CHF) occurs when the heart muscle is weakened. This affects the pumping action of the heart. Heart failure can affect the right side of the heart or the left side. But heart failure may affect not only the right side of the heart or only the left side. Although it may have started on one side, it can and often eventually does affect both sides.  Right-side heart failure  When the right side of the heart is weakened, it can t handle the blood it is getting from the rest of the body. This blood returns to the heart through veins. When too much pressure builds up in the veins, fluid leaks out into the tissues. Gravity then causes that fluid to move to those parts of the body that are the lowest. So one of the first symptoms of right-side CHF can include swelling in the feet and ankles. If the condition gets worse, the swelling can even go up past the knees. Sometimes it gets so severe, the liver can get congested as well.  Left-side heart failure  When the left side of the heart is weakened, it can t handle the blood it gets from the lungs. Pressure then builds up in the veins of the lungs, causing fluid to leak into the lung tissues. This may cause CHF and pulmonary  edema. This causes you to feel short of breath, weak, or dizzy. These symptoms are often worse with exertion, such as when climbing stairs or walking up hills. Lying with your head flat is uncomfortable and can make your breathing worse. This may make sleeping difficult. You may need to use extra pillows to elevate your upper body to sleep well. The same is true when just resting during the daytime.  There are many causes of heart failure including:    Coronary artery disease    Past heart attack (also known as acute myocardial infarction, or AMI)    High blood pressure    Damaged heart valve    Diabetes    Obesity    Cigarette smoking    Alcohol abuse  Heart failure is a chronic condition. There is no cure. The purpose of medical treatment is to improve the pumping action of the heart. The main way to do this is to remove excess water from the body. A number of medicines can help reach this goal, improve symptoms, and prevent the heart from becoming weaker. Sometimes, heart failure can become so severe that a device is placed in the heart to help with pumping. Another major goal is to better treat the causes of heart failure, such as diabetes and high blood pressure, by making changes in your lifestyle and maximizing medical control when needed.  Home care  Follow these guidelines when caring for yourself at home:    Check your weight every day. This is very important because a sudden increase in weight gain could mean worsening heart failure. Keep these things in mind:    Use the same scale every day.    Weigh yourself at the same time every day.    Make sure the scale is on a hard floor surface, not on a rug or carpet.    Keep a record of your weight every day so your healthcare provider can see it. If you are not given a log sheet for this, keep a separate journal for this purpose.     Cut back on the amount of salt (sodium) you eat. Follow your healthcare provider's recommendation on how much salt or sodium you  should have each day.    Avoid high-salt foods. These include olives, pickles, smoked meats, salted potato chips, and most prepared foods.    Don't add salt to your food at the table. Use only small amounts of salt when cooking.    Read the labels carefully on food packages to learn how much salt or sodium is in each serving in the package. Remember, a can or package of food may contain more than 1 serving. So if you eat all the food in the package, you may be getting more salt than you think.    Follow your healthcare provider's recommendations about how much fluid you should have. Be aware that some foods, such as soup, pudding, and juicy fruits like oranges or melons, contain liquid. You'll need to count the liquid in those foods as part of your daily fluid intake. Your provider can help you with this.    Stop smoking.    Cut back on how much alcohol you drink.    Lose weight if you are overweight. The excess weight adds a lot of stress on the workload of the heart.    Stay active. Talk with your provider about an exercise program that is safe for your heart.    Keep your feet elevated to reduce swelling. Ask your provider about support hose as a preventive treatment for daytime leg swelling.  Besides taking your medicine as instructed, an important part of treatment is lifestyle changes. These include diet, physical activity, stopping smoking, and weight control.  Improve your diet by including more fresh foods, cutting back on how much sugar and saturated fat you eat, and eating fewer processed foods and less salt.  Follow-up care  Follow up with your healthcare provider, or as advised.  Make sure to keep any appointments that were made for you. These can help better control your congestive heart failure. You will need to follow up with your provider on a routine basis to make sure your heart failure is well managed.  If an X-ray, ECG, or other tests were done, you will be told of any new findings that may  affect your care.  Call 911  Call 911 if you:    Become severely short of breath    Feel lightheaded, or feel like you might pass out or faint    Have chest pain or discomfort that is different than usual, the medicines your doctor told you to use for this don't help, or the pain lasts longer than 10 to 15 minutes    You suddenly develop a rapid heart rate  When to seek medical advice  The following may be signs that your heart failure is getting worse. Call your healthcare provider right away if any of these happen:    Sudden weight gain. This means 3 or more pounds in one day, or 5 or more pounds in 1 week.    Trouble breathing not related to being active    New or increased swelling of your legs or ankles    Swelling or pain in your abdomen    Breathing trouble at night. This means waking up short of breath or needing more pillows to breathe.    Frequent coughing that doesn t go away    Feeling much more tired than usual    1752-3429 [employer]. [address] [place] [address]. All rights reserved. This information is not intended as a substitute for professional medical care. Always follow your healthcare professional's instructions.                Discharge Instructions for Heart Failure  You have been diagnosed with heart failure. The term heart failure sounds scary because it suggests the heart is no longer working. But it actually means the heart isn't doing its job as well as it should. Heart failure happens when your heart muscle can't keep up with your body's need for blood flow. Symptoms of heart failure can be controlled by changes in your lifestyle and by following your doctor's advice.  Home care  Activity  Ask your health care provider about an exercise program. You can benefit from simple activities such as walking or gardening. Exercising most days of the week can make you feel better. Don't be discouraged if your progress is slow at first. Rest as needed and stop activity  if you develop symptoms such as chest pain, lightheadedness, or significant shortness of breath.  Diet  Follow a heart healthy diet. And make sure to limit the salt (sodium) in your diet. Salt causes your body to hold water. This makes your heart work harder. Limit your salt by doing the following:    Limit canned, dried, packaged, and fast foods.    Don't add salt to your food.    Season foods with herbs instead of salt.    Watch how much liquids you drink. Drinking too much can make heart failure worse. Talk with your health care provider about how much you should drink each day.    Limit the amount of alcohol you drink. It may harm your heart. Women should have no more than 1 drink a day and men should have no more than two.  Tobacco  If you smoke, you'll need to quit. Smoking increases your chances of having a heart attack, which makes heart failure worse. Quitting smoking is the number one thing you can do to improve your health. Enroll in a stop-smoking program to improve your chances of success. Talk with your health care provider about medicines or nicotine replacement therapy to help you quit smoking.  Medicine  Take your medicines exactly as prescribed. Learn the names and purpose of each of your medicines. Keep an accurate medicine list and current dosages with you at all times. Don't skip doses. If you miss a dose of your medicine, take it as soon as you remember. If you miss a dose and it's almost time for your next dose, just wait and take your next dose at the normal time. Don't take a double dose. If you are unsure, call your doctor's office.  Weight monitoring  Weigh yourself every day. A sudden weight gain can mean your heart failure is getting worse. Weigh yourself at the same time of day and in the same kind of clothes. Ideally, weigh yourself first thing in the morning after you empty your bladder, but before you eat breakfast. Your health care provider will show you how to track your weight. He  or she will also discuss with you when you should call if you have a sudden, unexpected increase in your weight.  In general your health care provider may ask you to report if your weight goes up by more than 2 pounds in 1 day or 5 pounds in 1 week, or whatever weight gain you were told by your doctor. This is a sign that you are retaining more fluid than you should be.  Follow-up care  Make a follow-up appointment as directed. Depending on the type and severity of heart failure you have, you may need follow-up as early as 7 days from hospital discharge. Keep appointments for checkups and lab tests that are needed to check your medicines and condition.  Recognize that your health and even survival depend on your following medical recommendations.  Symptoms  Heart failure can cause a variety of symptoms, including:    Shortness of breath    Difficulty breathing at night    Swelling in the legs and feet or in the belly (abdomen)    Becoming easily fatigued    Irregular or rapid heartbeat    Weakness or lightheadedness  It is important to know what to do if symptoms get worse or if you develop signs of worsening heart failure.     When to call your doctor  Call your doctor right away if you have any of these signs of worsening heart failure:    Sudden weight gain (more than 2 pounds in 1 day or 5 pounds in 1 week, or whatever weight gain you were told to report by your doctor)    Trouble breathing not related to being active    New or increased swelling of your legs or ankles    Swelling or pain in your abdomen    Breathing trouble at night (waking up short of breath, needing more pillows to breathe)    Frequent coughing that doesn't go away    Feeling much more tired than usual  When to seek emergency medical attention  Call 911 right away if you have:    Severe shortness of breath, such that you can't catch your breath even while resting    Severe shortness of breath    Severe chest pain that does not resolve with  rest or nitroglycerin    Pink, foamy mucus with cough and shortness of breath    A continuous rapid or irregular heartbeat    Passing out or fainting    Stroke symptoms such as sudden numbness or weakness on one side of your face, arm, or leg or sudden confusion, trouble speaking or vision changes     7834-6075 [employer]. [address] [place] [id number]. All rights reserved. This information is not intended as a substitute for professional medical care. Always follow your healthcare professional's instructions.                Heart Failure: Making Changes to Your Diet  You have a condition called heart failure. It is also known as congestive heart failure, or CHF. When you have heart failure, excess fluid is more likely to build up in your body. This makes the heart work harder to pump blood. Fluid buildup causes symptoms such as shortness of breath and swelling (edema). Controlling the amount of salt (sodium) you eat may help stop fluid from building up. Your doctor may also tell you to reduce the amount of fluid you drink.  Reading food labels    Your health care provider will tell you how much sodium you can eat each day. Read food labels to keep track. Keep in mind that certain foods are high in salt. These include canned, frozen, and processed foods. Check the amount of sodium in each serving. Watch out for high-sodium ingredients. These include MSG (monosodium glutamate), baking soda, and sodium phosphate.   Eating less salt  Give yourself time to get used to eating less salt. It may take a little while. Here are some tips to help:    Take the saltshaker off the table. Replace it with salt-free herb mixes and spices.    Eat fresh or plain frozen vegetables. These have much less salt than canned vegetables.    Choose low-sodium snacks like sodium-free pretzels, crackers, or air-popped popcorn.    Don t add salt to your food when you re cooking. Instead, season your foods with pepper,  lemon, garlic, or onion.    When you eat out, ask that your food be cooked without added salt.   If you re told to limit fluids  You may need to limit how much fluid you have to help prevent swelling. This includes anything that is liquid at room temperature, such as ice cream and soup. If your doctor tells you to limit fluid, try these tips:    Measure drinks in a measuring cup before you drink them. This will help you meet daily goals.    Chill drinks to make them more refreshing.    Suck on frozen lemon wedges to quench thirst.    Only drink when you re thirsty.    Chew sugarless gum or suck on hard candy to keep your mouth moist.      When to call your doctor  Call your doctor right away if you have any symptoms of worsening heart failure. These can include:    Sudden weight gain    Increased swelling of your legs or ankles    Trouble breathing when you re resting or at night     4553-0693 [employer]. [address] [place] [address]. All rights reserved. This information is not intended as a substitute for professional medical care. Always follow your healthcare professional's instructions.                Heart Failure: Warning Signs of a Flare-Up  You have a condition called heart failure (also known as congestive heart failure, or CHF). Once you have heart failure, flare-ups can happen. Below are signs that can mean your heart failure is getting worse. If you notice any of these warning signs, call your health care provider.  Swelling      Your ankles or lower legs get puffier.    Your shoes feel too tight.    Your clothes are tighter in the waist.    You have trouble getting rings on or off your fingers.  Shortness of breath    You have to breathe harder even when you re doing your normal activities or when you re resting.    You wake up at night short of breath or coughing.    You need to use more pillows or sit up to sleep.  Other warning signs    You feel weaker, dizzy, or more  tired.    You have chest pain or changes in your heartbeat.    You have a cough that won t go away.    You can t remember things or don t feel like eating.  Tracking your weight  Gaining weight is often the first warning sign that heart failure is getting worse. Gaining even a few pounds can be a sign that your body is retaining excess water and salt. Weighing yourself each day is the best way to know if you're retaining water. Your health care provider will show you how to track your weight. Call your doctor if you gain more than 2 pounds in 1 day, 5 pounds in 1 week, or whatever weight gain you were told to report by your doctor. This is often a sign of worsening heart failure. Your doctor will tell you what to do next.      8392-3372 The [employer]. [address] [place] [address], [place] [address]. All rights reserved. This information is not intended as a substitute for professional medical care. Always follow your healthcare professional's instructions.                Heart Failure: Tracking Your Weight  You have a condition called heart failure (also known as congestive heart failure, or CHF). When you have heart failure, a sudden weight gain or a steady rise in weight is a warning sign that your body is retaining too much water and salt. This could mean your heart failure is getting worse. Weighing yourself each day is the best way to know if you re retaining water. If your weight goes up quickly, call your doctor. You will be given instructions on how to get rid of the excess water. This will help your heart work better.  Call your doctor if you gain more than 2 pounds in 1 day, or more than 5 pounds in 1 week, or whatever weight gain you were told to report by your doctor. This is often a sign of worsening heart failure. Your doctor will tell you what to do next.   Tips for weighing yourself      Weigh yourself at the same time each morning, wearing the same clothes. Weigh yourself after urinating and  before eating.    Use the same scale each day. Put it on a flat, hard surface -- not on a rug or carpet.    Do not stop weighing yourself. If you forget one day, weigh again the next morning.  How to use your weight chart    Keep your weight chart near the scale. Write your weight on the chart as soon as you get off the scale.    Fill in the month and the start date on the chart. Then write down your weight each day. Your chart will look like this:      If you miss a day, leave the space blank. Weigh yourself the next day and write your weight in the next space.    Take your weight chart with you when you go to see your doctor.    1994-1800 [employer]. [address] [place] [address], [place] [id number]. All rights reserved. This information is not intended as a substitute for professional medical care. Always follow your healthcare professional's instructions.                Preventing Falls in the Home  As you get older, falls are more likely. That s because your reaction time slows. Your muscles and joints may also get stiffer, making them less flexible. Illness, medications, and vision changes can also affect your balance. A fall could leave you unable to live on your own. To make your home safer, follow these tips:   Floors:    Put nonskid pads under area rugs.    Remove throw rugs.    Replace worn floor coverings.    Tack carpets firmly to each step on carpeted stairs. Put nonskid strips on the edges of uncarpeted stairs.    Keep floors and stairs free of clutter and cords.    Arrange furniture so there are clear pathways.    Clean up any spills right away.   Bathrooms:      Install grab bars in the tub or shower.    Apply nonskid strips or put a nonskid rubber mat in the tub or shower.    Sit on a bath chair to bathe.    Use bathmats with nonskid backing.   Lighting:    Keep a flashlight in each room.    Put a nightlight along the pathway between the bedroom and the bathroom.    5988-7779 [employer]. [address] [place] [address], [place] [address]. All rights reserved. This information is not intended as a substitute for professional medical care. Always follow your healthcare professional's instructions.

## 2017-06-10 NOTE — ED PROVIDER NOTES
History     Chief Complaint   Patient presents with     Fall     fell backwards and struck her back on the counter     HPI  Ricarda Bae is a 82 year old female who fell at her home when opening the refrigerator.  She struck her back on the counter and had immediate onset of back pain.  This has worsened since the injury.  She is very concerned because she has had multiple back surgeries in the past and has a plate there. She has no neck pain, states she did not hit her head or lose consciousness, but is rather vague.  She has severe pedal edema that extends to the sacrum, lower legs are erythematous in addition to having firm edema, the skin is weeping serous fluid.  This has only been going on for about the last 2-3 days according to both the patient and her .  She had an oxygen saturation of 77% on room air on arrival, required 6 liters / NC to get her above 92%.    I have reviewed the Medications, Allergies, Past Medical and Surgical History, and Social History in the Epic system.    Allergies: No Known Allergies      Current Facility-Administered Medications on File Prior to Encounter:  glycopyrrolate (ROBINUL) injection   neostigmine (PROSTIGMINE) injection     Current Outpatient Prescriptions on File Prior to Encounter:  warfarin (COUMADIN) 5 MG tablet 7.5mg Mon,Wed,Fri and 5mg all other days or as directed by warfarin clinic   warfarin (COUMADIN) 5 MG tablet Take 5 mg by mouth daily Take 7.5mg Mon, Wed, Fri; 5 mg all other days or as directed by warfarin clinic   warfarin (COUMADIN) 2.5 MG tablet Take as directed by [facility]   FUROSEMIDE PO Take 40 mg by mouth daily   potassium chloride naman er (K-DUR) Take 40 mEq by mouth 2 times daily   hypromellose (ARTIFICIAL TEARS) 0.4 % SOLN Place 1 drop into both eyes every hour as needed for dry eyes   BuPROPion HCl (WELLBUTRIN XL PO) Take 300 mg by mouth daily   HYDROcodone-acetaminophen (NORCO) 5-325 MG per tablet Take 1 tablet by mouth every  6 hours as needed for moderate to severe pain   MELATONIN PO Take 10 mg by mouth At Bedtime   metoprolol (LOPRESSOR) 100 MG tablet Take 1 tablet (100 mg) by mouth 2 times daily   ASPIRIN PO Take 81 mg by mouth daily    HYDROCHLOROTHIAZIDE PO Take 25 mg by mouth daily   diphenoxylate-atropine (LOMOTIL) 2.5-0.025 MG per tablet TAKE 1 TABLET BY MOUTH THREE TIMES DAILY AS NEEDED   FLORASTOR 250 MG capsule TAKE 1 CAPSULE BY MOUTH AT BEDTIME   ondansetron (ZOFRAN-ODT) 4 MG disintegrating tablet DISSOLVE 1 TABLET ON TONGUE EVERY 6 HOURS AS NEEDED FOR NAUSEA   psyllium 0.52 G capsule Take 1 capsule by mouth daily   alpha-d-galactosidase (BEANO) tablet Take 2-3 tablets by mouth 3 times daily as needed for intestinal gas (before meal that is gas producing.)   magnesium hydroxide (MILK OF MAGNESIA) 400 MG/5ML suspension Take 5 mLs by mouth daily as needed for constipation or heartburn       Patient Active Problem List   Diagnosis     History of stroke     A-fib (H)     Carotid stenosis     S/P carotid endarterectomy     CHF (congestive heart failure) (H)     Hypertension, poor control     Anemia     Hospice care patient     Bowel obstruction (H)     SBO (small bowel obstruction) (H)     Intussusception of colon (H)     Small bowel obstruction (H)     Signet ring cell adenocarcinoma (H)     Cardiac pacemaker - Medtronic dual lead pacemaker - Not Dependent     Long-term (current) use of anticoagulants [Z79.01]       Past Surgical History:   Procedure Laterality Date     Breast biopsy x2       DECOMPRESSION, FUSION LUMBAR POSTERIOR THREE + LEVELS, COMBINED       ENDARTERECTOMY CAROTID  4/11/2013    Procedure: ENDARTERECTOMY CAROTID;  RIGHT CAROTID ENDARTERECTOMY WITH EEG;  Surgeon: Yo Bateman MD;  Location: [facility]     LAPAROTOMY EXPLORATORY N/A 10/27/2016    Procedure: LAPAROTOMY EXPLORATORY;  Surgeon: Ananth Quijano MD;  Location: [facility]     RELEASE CARPAL TUNNEL  2004     removal of basal cell cancer, left inferior  "eyelid       THORACENTESIS  03/2014       Social History   Substance Use Topics     Smoking status: Former Smoker     Types: Cigarettes     Quit date: 8/9/2012     Smokeless tobacco: Never Used     Alcohol use 3.5 oz/week     7 Standard drinks or equivalent per week      Comment: 7 drinks a week       Most Recent Immunizations   Administered Date(s) Administered     Influenza (High Dose) 3 valent vaccine 10/21/2016       BMI: Estimated body mass index is 16.24 kg/(m^2) as calculated from the following:    Height as of 11/27/16: 1.753 m (5' 9\").    Weight as of 11/27/16: 49.9 kg (110 lb).      Review of Systems   Constitutional: Positive for activity change and unexpected weight change. Negative for diaphoresis, fatigue and fever.        Has lost a lot of weight over the last year, now weight is up some due to edema.   HENT: Negative for facial swelling.    Respiratory: Negative for cough, chest tightness, shortness of breath and wheezing.    Cardiovascular: Positive for palpitations and leg swelling. Negative for chest pain.   Gastrointestinal: Negative for abdominal distention, abdominal pain, constipation, diarrhea, nausea and vomiting.   Genitourinary: Positive for frequency. Negative for dysuria and urgency.   Musculoskeletal: Positive for back pain. Negative for joint swelling and neck pain.   Skin: Positive for wound.        Skin tear on right elbow, abrasion on right scapula.   Neurological: Positive for weakness. Negative for syncope, light-headedness, numbness and headaches.   Psychiatric/Behavioral: Negative for dysphoric mood. The patient is not nervous/anxious.        Physical Exam   BP: 117/83  Heart Rate: 79  SpO2: (S) (!) 77 % (oxygen placed at 6 LPM via NC oxygen level up to 91 %)  Physical Exam   Constitutional: She appears well-developed and well-nourished.   HENT:   Head: Normocephalic.   Mouth/Throat: Oropharynx is clear and moist.   Eyes: EOM are normal. Pupils are equal, round, and reactive to " light.   Neck:   Arrived in C-collar.  Collar removed after negative c-spine CT and clinical clearance.   Cardiovascular: Normal rate, normal heart sounds and intact distal pulses.  An irregularly irregular rhythm present. Frequent extrasystoles are present.   No murmur heard.  Pulmonary/Chest: Effort normal and breath sounds normal.   Abdominal: Soft. Bowel sounds are normal. She exhibits no distension. There is no tenderness.   Musculoskeletal: Normal range of motion. She exhibits edema and tenderness.   Neurological: She is alert.   Vague.       ED Course     ED Course     Procedures             EKG Interpretation:      Interpreted by Yasmin Altamirano  Time reviewed: 1930  Symptoms at time of EKG: anasarca   Rhythm: atrial fibrillation - controlled  Rate: Normal  Axis: Right Axis Deviation  Ectopy: premature ventricular contractions (frequent)  Conduction: right bundle branch block (incomplete)  ST Segments/ T Waves: Non-specific ST-T wave changes  Q Waves: none  Comparison to prior: Last EKG sinus in 2015, no interval studies available.    Clinical Impression: non-specific EKG and atrial fibrillation, unknown if chronic or new    Labs Ordered and Resulted from Time of ED Arrival Up to the Time of Departure from the ED   CBC WITH PLATELETS DIFFERENTIAL - Abnormal; Notable for the following:        Result Value    Hemoglobin 11.1 (*)     MCH 22.5 (*)     MCHC 28.9 (*)     RDW 21.6 (*)     Absolute Lymphocytes 0.7 (*)     All other components within normal limits   D-DIMER (FV RANGE) - Abnormal; Notable for the following:     D-Dimer ng/mL >5250 (*)     All other components within normal limits   NT PROBNP INPATIENT - Abnormal; Notable for the following:     N-Terminal Pro BNP Inpatient 08515 (*)     All other components within normal limits   COMPREHENSIVE METABOLIC PANEL - Abnormal; Notable for the following:     Carbon Dioxide 33 (*)     Glucose 113 (*)     Albumin 2.8 (*)     Protein Total 6.0 (*)      All other components within normal limits   CRP INFLAMMATION   MAGNESIUM   LACTIC ACID   TROPONIN I   UA MACROSCOPIC WITH REFLEX TO MICRO AND CULTURE   TSH   VITAL SIGNS   PULSE OXIMETRY NURSING   CARDIAC CONTINUOUS MONITORING   PERIPHERAL IV CATHETER   STRICT INTAKE AND OUTPUT       Assessments & Plan (with Medical Decision Making)   Patient has CHF with atrial fibrillation and hypoxia. Pro-BNP highly elevated as is d-dimer, but d-dimer could be due to cardiac issues.  Albumin is low, which likely is not helping the edema.  She is mildly anemic.  CT PE study done and result pending at the time of admission to  center.  CXR shows CHF and cardiomegaly.   Dr. Guillory will admit to medicine.     I have reviewed the nursing notes.    I have reviewed the findings, diagnosis, plan and need for follow up with the patient.       ED to Inpatient Handoff:    Discussed with Dr. Guillory at 1945  Patient accepted for Inpatient Stay  Pending studies include CT reads  Code Status: Not Addressed           New Prescriptions    No medications on file       Final diagnoses:   Hypoxia   Edema, unspecified type   Acute bilateral thoracic back pain   Acute systolic congestive heart failure (H)       6/10/2017   [facility]     Yasmin Wise MD  06/10/17 [id number]

## 2017-06-10 NOTE — ED NOTES
"Patient reports she was in her kitchen; states she thinks she was trying to open her refrigerator (which sticks) and she lost her balance and fell backwards into some cupboards.  Patient remembers hitting the cupboard and then sliding down onto the floor.  Patient was unable to get up by herself.  She was able to call a friend who then called 911.  Patient is awake/alert and interactive.  c-collar is in place on arrival.  Patient c/o \"stiff neck\" and tailbone pain.  RAMON's X 4.  Initial room air sats are 75 %, oxygen was placed via NC at 6 LPM and sats improved to 91%; will continue to monitor.  Call light within reach.  Trauma eval called.  "

## 2017-06-10 NOTE — IP AVS SNAPSHOT
[facility]    [address] [address]    Phone:  [phone]    Fax:  [phone]                                       After Visit Summary   6/10/2017    Ricarda Bae    MRN: [id number]           After Visit Summary Signature Page     I have received my discharge instructions, and my questions have been answered. I have discussed any challenges I see with this plan with the nurse or doctor.    ..........................................................................................................................................  Patient/Patient Representative Signature      ..........................................................................................................................................  Patient Representative Print Name and Relationship to Patient    ..................................................               ................................................  Date                                            Time    ..........................................................................................................................................  Reviewed by Signature/Title    ...................................................              ..............................................  Date                                                            Time

## 2017-06-11 LAB
ANION GAP SERPL CALCULATED.3IONS-SCNC: 7 MMOL/L (ref 3–14)
BUN SERPL-MCNC: 19 MG/DL (ref 7–30)
CALCIUM SERPL-MCNC: 8.2 MG/DL (ref 8.5–10.1)
CHLORIDE SERPL-SCNC: 102 MMOL/L (ref 94–109)
CO2 SERPL-SCNC: 34 MMOL/L (ref 20–32)
CREAT SERPL-MCNC: 0.74 MG/DL (ref 0.52–1.04)
ERYTHROCYTE [DISTWIDTH] IN BLOOD BY AUTOMATED COUNT: 21.2 % (ref 10–15)
GFR SERPL CREATININE-BSD FRML MDRD: 76 ML/MIN/1.7M2
GLUCOSE SERPL-MCNC: 102 MG/DL (ref 70–99)
HCT VFR BLD AUTO: 36.3 % (ref 35–47)
HGB BLD-MCNC: 10.6 G/DL (ref 11.7–15.7)
INR PPP: 2.08 (ref 0.8–1.2)
MCH RBC QN AUTO: 22.6 PG (ref 26.5–33)
MCHC RBC AUTO-ENTMCNC: 29.2 G/DL (ref 31.5–36.5)
MCV RBC AUTO: 77 FL (ref 78–100)
PLATELET # BLD AUTO: 165 10E9/L (ref 150–450)
POTASSIUM SERPL-SCNC: 3.6 MMOL/L (ref 3.4–5.3)
RBC # BLD AUTO: 4.7 10E12/L (ref 3.8–5.2)
SODIUM SERPL-SCNC: 143 MMOL/L (ref 133–144)
WBC # BLD AUTO: 6.2 10E9/L (ref 4–11)

## 2017-06-11 PROCEDURE — A9270 NON-COVERED ITEM OR SERVICE: HCPCS | Mod: GY | Performed by: FAMILY MEDICINE

## 2017-06-11 PROCEDURE — 40000275 ZZH STATISTIC RCP TIME EA 10 MIN

## 2017-06-11 PROCEDURE — 12000000 ZZH R&B MED SURG/OB

## 2017-06-11 PROCEDURE — 36415 COLL VENOUS BLD VENIPUNCTURE: CPT | Performed by: FAMILY MEDICINE

## 2017-06-11 PROCEDURE — 71020 ZZHC CHEST TWO VIEWS, FRONT/LAT: CPT | Mod: TC

## 2017-06-11 PROCEDURE — 85610 PROTHROMBIN TIME: CPT | Performed by: FAMILY MEDICINE

## 2017-06-11 PROCEDURE — 80048 BASIC METABOLIC PNL TOTAL CA: CPT | Performed by: FAMILY MEDICINE

## 2017-06-11 PROCEDURE — 85027 COMPLETE CBC AUTOMATED: CPT | Performed by: FAMILY MEDICINE

## 2017-06-11 PROCEDURE — 25000132 ZZH RX MED GY IP 250 OP 250 PS 637: Mod: GY | Performed by: FAMILY MEDICINE

## 2017-06-11 PROCEDURE — 40000786 ZZHCL STATISTIC ACTIVE MRSA SURVEILLANCE CULTURE: Performed by: FAMILY MEDICINE

## 2017-06-11 PROCEDURE — 25000128 H RX IP 250 OP 636: Performed by: FAMILY MEDICINE

## 2017-06-11 RX ORDER — WARFARIN SODIUM 5 MG/1
5 TABLET ORAL
Status: COMPLETED | OUTPATIENT
Start: 2017-06-11 | End: 2017-06-11

## 2017-06-11 RX ORDER — WARFARIN SODIUM 7.5 MG/1
7.5 TABLET ORAL
Status: DISCONTINUED | OUTPATIENT
Start: 2017-06-11 | End: 2017-06-11

## 2017-06-11 RX ADMIN — OXYCODONE HYDROCHLORIDE 5 MG: 5 TABLET ORAL at 14:52

## 2017-06-11 RX ADMIN — HYDROCODONE BITARTRATE AND ACETAMINOPHEN 1 TABLET: 5; 325 TABLET ORAL at 09:48

## 2017-06-11 RX ADMIN — Medication 250 MG: at 00:01

## 2017-06-11 RX ADMIN — TIZANIDINE 4 MG: 4 TABLET ORAL at 17:10

## 2017-06-11 RX ADMIN — FUROSEMIDE 40 MG: 40 TABLET ORAL at 09:48

## 2017-06-11 RX ADMIN — Medication 250 MG: at 09:48

## 2017-06-11 RX ADMIN — POTASSIUM CHLORIDE 40 MEQ: 20 TABLET, EXTENDED RELEASE ORAL at 20:03

## 2017-06-11 RX ADMIN — OXYCODONE HYDROCHLORIDE 10 MG: 5 TABLET ORAL at 20:03

## 2017-06-11 RX ADMIN — METOPROLOL TARTRATE 50 MG: 50 TABLET, FILM COATED ORAL at 00:01

## 2017-06-11 RX ADMIN — BUPROPION HYDROCHLORIDE 300 MG: 300 TABLET, EXTENDED RELEASE ORAL at 09:47

## 2017-06-11 RX ADMIN — ASPIRIN 81 MG 81 MG: 81 TABLET ORAL at 09:48

## 2017-06-11 RX ADMIN — Medication 0.52 G: at 09:48

## 2017-06-11 RX ADMIN — Medication 250 MG: at 20:03

## 2017-06-11 RX ADMIN — HYDROCODONE BITARTRATE AND ACETAMINOPHEN 1 TABLET: 5; 325 TABLET ORAL at 16:11

## 2017-06-11 RX ADMIN — METOPROLOL TARTRATE 50 MG: 50 TABLET, FILM COATED ORAL at 20:02

## 2017-06-11 RX ADMIN — WARFARIN SODIUM 5 MG: 5 TABLET ORAL at 17:10

## 2017-06-11 RX ADMIN — FUROSEMIDE 40 MG: 10 INJECTION, SOLUTION INTRAVENOUS at 06:44

## 2017-06-11 RX ADMIN — OXYCODONE HYDROCHLORIDE 5 MG: 5 TABLET ORAL at 05:21

## 2017-06-11 RX ADMIN — METOPROLOL TARTRATE 50 MG: 50 TABLET, FILM COATED ORAL at 09:47

## 2017-06-11 RX ADMIN — Medication 10 MG: at 00:01

## 2017-06-11 RX ADMIN — POTASSIUM CHLORIDE 40 MEQ: 20 TABLET, EXTENDED RELEASE ORAL at 09:45

## 2017-06-11 RX ADMIN — HYDROCHLOROTHIAZIDE 25 MG: 25 TABLET ORAL at 09:47

## 2017-06-11 NOTE — ED NOTES
Pt to be admitted to acute care unit.  Pt to CT angio to be completed, then pt will be brought to room.  Pt place on 10L NRB per MD due to sats 70%.  Pt voided prior to transfer to CT, then acute care.  Family and pt cooperative with plan.

## 2017-06-11 NOTE — PLAN OF CARE
[facility] Inpatient Admission Note:    Patient admitted to 3110/3110-1 at approximately 2130 via cart accompanied by spouse from emergency room . Report received from Federica UMANZOR in SBAR format at 2049 via telephone. Patient transferred to bed via slide board.. Patient is alert and oriented X 3, reports pain; rates at 9 on 0-10 scale.  Patient oriented to room, unit, hourly rounding, and plan of care. Explained admission packet and patient handbook with patient bill of rights brochure. Will continue to monitor and document as needed.     Inpatient Nursing criteria listed below was met:      Health care directives status obtained and documented: Yes      Care Everywhere authorization obtained N/A      MRSA swab completed for patient 65 years and older: pending      Patient identifies a surrogate decision maker: Yes If yes, who: Aleksandar and son Dakotah Contact Information:  [phone] and son [phone]      Core Measure diagnosis present:No. If yes, state diagnosis: N/A       If initial lactic acid >2.0, repeat lactic acid drawn within one hour of arrival to unit: NA. If no, state reason: N/A      Vaccination assessment and education completed: Yes   Vaccinations received prior to admission: Pneumovax yes  Influenza(seasonal)  YES   Vaccination(s) ordered: not given today because patient reports she is up to date      Clergy visit ordered if patient requests: No      Skin issues/needs documented: Yes      Isolation Patient: no Education given, correct sign in place and documentation row added to PCS:  No      Fall Prevention Yes: Care plan updated, education given and documented, sticker and magnet in place: Yes      Care Plan initiated: Yes      Education Documented (including assessment): Yes      Patient has discharge needs : Yes If yes, please explain:needs home health care.

## 2017-06-11 NOTE — PROGRESS NOTES
[facility]    Family Practice Progress Note    Date of Service (when I saw the patient): 06/11/2017     Assessment & Plan   Ricarda Bae is a 82 year old female who was admitted on 6/10/2017.     Principal Problem:    Acute on chronic congestive heart failure with right ventricular diastolic dysfunction (H)    Assessment: clinically improving with decreased oxygen needs    Plan: continue diuresis, hep lock IV  Active Problems:    Essential hypertension    Assessment: low    Plan: monitor during diuresis    Anemia    Assessment: chronic, stable, iron deficient    Plan: supplemental iron, monitor    Long-term (current) use of anticoagulants [Z79.01]    Assessment: therapeutic    Plan: monitor      DVT Prophylaxis: Warfarin  Code Status: DNR / DNI    Darien Guillory    Interval History   Doing well. Continues to improve. Pain is well-controlled. No fevers. Requiring less oxygen. Appears comfortable. I&O positive. Legs erythematous and edematous.    Review Of Systems  CONSTITUTIONAL:  negative for  fevers  HEENT:  negative  RESPIRATORY:  negative for cough or shortness of breath  CARDIOVASCULAR:  negative for  chest pain, palpitations  GASTROINTESTINAL:  negative for nausea, vomiting, diarrhea and constipation  GENITOURINARY:  negative for frequency and dysuria  INTEGUMENT/BREAST:  positive for erythema of lower extremities  MUSCULOSKELETAL:  positive for lower extremity edema  BEHAVIOR/PSYCH:  positive for impaired mmeory      Physical Exam   Temp: 98.3  F (36.8  C) Temp src: Tympanic BP: 100/54 Pulse: 78 Heart Rate: 84 Resp: 20 SpO2: 95 % O2 Device: Nasal cannula Oxygen Delivery: 4 LPM  Vitals:    06/10/17 2150   Weight: 61.3 kg (135 lb 2.3 oz)     Vital Signs with Ranges  Temp:  [98.1  F (36.7  C)-99  F (37.2  C)] 98.3  F (36.8  C)  Pulse:  [78-79] 78  Heart Rate:  [73-84] 84  Resp:  [20] 20  BP: (100-167)/(54-91) 100/54  SpO2:  [77 %-100 %] 95 %  I/O last 3 completed shifts:  In: 1128 [P.O.:340;  I.V.:788]  Out: 875 [Urine:875]    Peripheral IV 06/10/17 Right (Active)   Site Assessment WDL 6/11/2017  6:53 AM   Line Status Saline locked 6/11/2017  6:53 AM   Phlebitis Scale 0-->no symptoms 6/11/2017  6:53 AM   Infiltration Scale 0 6/11/2017  6:53 AM   Infiltration Site Treatment Method  None 6/11/2017 12:00 AM   Extravasation? No 6/11/2017 12:00 AM   Dressing Intervention New dressing  6/11/2017 12:00 AM   Number of days:1       Peripheral IV 06/10/17 Right Lower forearm (Active)   Number of days:1       Incision/Surgical Site 10/27/16 Abdomen (Active)   Number of days:227     Line/device assessment(s) completed for medical necessity    Constitutional: awake, alert, cooperative, no apparent distress, appears stated age and mildly obese  ENT: normocepalic, without obvious abnormality  Respiratory: no increased work of breathing, decreased air exchange and clear to auscultation  Cardiovascular: regular rate and rhythm and no murmur noted  GI: normal bowel sounds, soft and non-tender  Skin: lower extremity erythemar  Musculoskeletal: bilateral lower extremity pitting edema present  Neuropsychiatric: General: calm and normal eye contact  Level of consciousness: alert / normal  Affect: normal and pleasant  Orientation: oriented to self and place  Memory and insight: impaired    Medications     Warfarin Therapy Reminder         sodium chloride (PF)  3 mL Intracatheter Q8H     aspirin chewable tablet 81 mg  81 mg Oral Daily     buPROPion (WELLBUTRIN XL) 24 hr tablet 300 mg  300 mg Oral Daily     saccharomyces boulardii  250 mg Oral BID     furosemide (LASIX) tablet 40 mg  40 mg Oral Daily     hydrochlorothiazide (HYDRODIURIL) tablet 25 mg  25 mg Oral Daily     melatonin tablet 10 mg  10 mg Oral At Bedtime     potassium chloride naman er  40 mEq Oral BID     psyllium  1 capsule Oral Daily     metoprolol  50 mg Oral BID       Data     Recent Labs  Lab 06/11/17  0516 06/10/17  1922 06/09/17   WBC 6.2 7.2  --    HGB 10.6*  11.1*  --    MCV 77* 78  --     182  --    INR 2.08*  --  3.7*    142  --    POTASSIUM 3.6 3.6  --    CHLORIDE 102 104  --    CO2 34* 33*  --    BUN 19 21  --    CR 0.74 0.73  --    ANIONGAP 7 5  --    KIMBERLY 8.2* 8.5  --    * 113*  --    ALBUMIN  --  2.8*  --    PROTTOTAL  --  6.0*  --    BILITOTAL  --  0.8  --    ALKPHOS  --  139  --    ALT  --  28  --    AST  --  32  --    TROPI  --  0.026  --

## 2017-06-11 NOTE — ED NOTES
Pt was placed onto bedpan; pt voided; urine sent to lab; pt requesting ice and pain meds; ice was given; RN notified about meds

## 2017-06-11 NOTE — PLAN OF CARE
Problem: Patient Goal: Rt Focus  Goal: 1. Patient Goal: RT Focus  Pt will return to baseline respiratory status   Sp02 94% on 3 lpm NC. BS dim w/fine bibasilar crackles. No nebs given this shift.

## 2017-06-11 NOTE — PLAN OF CARE
Problem: Goal Outcome Summary  Goal: Goal Outcome Summary  Outcome: Improving  A&O. VSS. O2 92-94 % 3L NC  Reports low back pain secondary to fall at home. Medicated with 1 norco with stated pain relief. Scattered bruising, abrasions. BLEs red with few small blisters, occasional weeping. Diuresing with PO lasix. SBA to BR with walker. Spouse at bedside this AM. Pain returns 9/10 low back . Medicated with 5mg Roxicodone. Will monitor.  Face to face report given with opportunity to observe patient.    Report given to Lizzeth Amezcua   6/11/2017  3:30 PM        Problem: Cardiac: Heart Failure (Adult)  Goal: Signs and Symptoms of Listed Potential Problems Will be Absent or Manageable (Cardiac: Heart Failure)  Signs and symptoms of listed potential problems will be absent or manageable by discharge/transition of care (reference Cardiac: Heart Failure (Adult) CPG).   Outcome: Improving    06/11/17 1438   Cardiac: Heart Failure   Problems Assessed (Heart Failure) all   Problems Present (Heart Failure) dysrhythmia/arrhythmia;fluid/electrolyte imbalance;respiratory compromise         Problem: Fall Risk (Adult)  Goal: Identify Related Risk Factors and Signs and Symptoms  Related risk factors and signs and symptoms are identified upon initiation of Human Response Clinical Practice Guideline (CPG)   Outcome: Improving    06/11/17 1438   Fall Risk   Fall Risk: Related Risk Factors age-related changes;culprit medication(s);history of falls;gait/mobility problems       Goal: Absence of Falls  Patient will demonstrate the desired outcomes by discharge/transition of care.   Outcome: Improving    06/11/17 1438   Fall Risk (Adult)   Absence of Falls making progress toward outcome

## 2017-06-11 NOTE — PLAN OF CARE
Problem: Patient Goal: Social Work Focus  Goal: 1. Patient Goal: Social Work Focus  Met with Ricarda and family for CM/SS assessment and Medicare letter completion.  See flow sheet for completed assessment. SANDOR identified as low.     Ricarda lives at [address] with her , Aleksandar.  She is independent with ADL's and medications.  She uses a walker for mobility.  Aleksandar does the cooking, laundry and driving.  They have a cleaning lady that comes in once a month to do the deep cleaning.  They have four children, who are involved and supportive.  She states that she has a health care directive completed.  States that her health care agent is probably Aleksandar and then her son, Dakotah as he lives in town.  She identifies as [employer] and this is important to her.  She enjoys playing bridge.  She is not currently connected with support services or a .  It was shared that about two years ago Ricarda was on hospice through [facility] and transitioned out of the program.  She and family would like for home care to be added upon discharge.  They understand and expect Ricarda will be homebound once discharged from the hospital.  Per discharge planning, the choice of vendor list has been provided to the patient/family for homecare.  [facility]: [facility]                 [phone] .  Left message for Leandro at [facility] in regards to referral.       Ricarda sees Dr. Guillory for primary care and was last seen about three months ago.  She sees Dr. Willis for dental care and was last seen about three months ago.  She sees Dr. Valdez for eye care and was last seen within the last month.  She uses [facility] for medications.       Plan: home with [facility].  Family to provide transportation.  SS will continue to remain available for discharge needs.

## 2017-06-11 NOTE — PHARMACY-ANTICOAGULATION SERVICE
Clinical Pharmacy - Warfarin Dosing Consult     Pharmacy has been consulted to manage this patient s warfarin therapy.  Indication: Atrial Fibrillation  Therapy Goal: INR 2-3  Provider/Team: Dr. Guillory  [facility]: [place]  Warfarin Prior to Admission: Yes  Warfarin PTA Regimen: 7.5 mg Monday and 5 mg the rest of the week  Dose Comments: resume home dose    INR   Date Value Ref [facility] Status   06/11/2017 2.08 (H) 0.80 - 1.20 Final     INR Protime   Date Value Ref Range Status   06/09/2017 3.7 (A) 0.86 - 1.14 Final       Recommend warfarin 5 mg today.  Pharmacy will monitor Ricarda C Cicmil daily and order warfarin doses to achieve specified goal.      Please contact pharmacy as soon as possible if the warfarin needs to be held for a procedure or if the warfarin goals change.      Kathia Coreas, PharmD  June 11, 2017

## 2017-06-11 NOTE — PLAN OF CARE
Problem: Goal Outcome Summary  Goal: Goal Outcome Summary  Outcome: No Change  Pt. Has been afebrile through the night, VS as charted.  Her O2 sats are in the low to mid 90's - weaned down to 5 LPM via NC, lung sounds are diminished with some fine crackles.  She does rate pain/discomfort this morning 8/10 to lower back - and then just some generalized aching discomfort - received Hailey.  She does have IV fluids running at 100 mL/hr -oral intake of 220 mL, voided 425 mL using bedpan - decline boggs.  She does have 2 + pitting edema to BLE with cellulitis, she does have a couple open areas that are weeping.  She also has a scratch to right shoulder blade and also to right elbow.  She has remained free of falls through the night, call light within reach-does make her needs known, frequent rounding done to assess needs, bed alarm on.

## 2017-06-11 NOTE — PLAN OF CARE
O2 sats are in the mid 90's on 5 LPM via NC - weaned down to 4 LPM.    Face to face report given with opportunity to observe patient.    Report given to Andrea Rios   6/11/2017  7:15 AM

## 2017-06-11 NOTE — PLAN OF CARE
Problem: Goal Outcome Summary  Goal: Goal Outcome Summary  Outcome: No Change  Patient admitted this evening with weakness and a fall at home, patient has been reporting her pain in her back is 9/10, and stated this is tolerable, patient has used the bed pan to void, and was too weak and unsteady to get up to use the commode, patient has a small cut on her right shoulder blade and has weeping red warm cellulitis to her lower legs bilaterally, there are some scars and scabs noted to the legs, family was here and are very involved and attentive.

## 2017-06-11 NOTE — PLAN OF CARE
Problem: Patient Goal: Rt Focus  Goal: 1. Patient Goal: RT Focus  Pt will return to baseline respiratory status   [facility] - Respiratory Clinical Assessment     Current Patient History:    Respiratory History: tobacco abuse and CHF    Smoking History: former smoker    Oxygen dependency: No,    Oxygen prescribed: none     6/10/2017 11:19 PM Patient Initial Assessment:     Level of Consciousness: alert , calm    Skin color: pale    Lung sounds:dim with faint crackles in bases    Respirations:  Normal with easy respirations and no use of accessory muscles to breathe    Respiratory symptoms: no other unusual symptoms    Cough/Sputum:  none    Current oxygenation status: 100% NRB, changed to 6L NC

## 2017-06-11 NOTE — PROVIDER NOTIFICATION
Called Dr. Guillory to clarify boggs order - stated that if patient is continent and able to use bedpan, ok to leave out.  Boggs not placed.  She stated that she would prefer not to have boggs, and has been able to void on bedpan.

## 2017-06-11 NOTE — CONSULTS
Clinical Pharmacy - Warfarin Dosing Consult     Pharmacy has been consulted to manage this patient s warfarin therapy.       INR Protime   Date Value Ref [facility] Status   06/09/2017 3.7 (A) 0.86 - 1.14 Final   05/31/2017 1.6 (A) 0.86 - 1.14 Final       Recommend warfarin 0 mg today.  Pharmacy will monitor Ricarda C Cicmil daily and order warfarin doses to achieve specified goal.      Please contact pharmacy as soon as possible if the warfarin needs to be held for a procedure or if the warfarin goals change.

## 2017-06-11 NOTE — H&P
[facility]    History and Physical  Family Practice     Date of Admission:  6/10/2017  Date of Service (when I saw the patient): 06/10/17    Assessment & Plan   Ricarda Bae is a 82 year old female who presents with a fall with weakness and hypoxia    Disposition: more than two nights stay anticipated to evaluate and treat acute on chronic right sided congestive heart failure    Active problems:   Acute on chronic right sided congestive heart failure    DVT Prophylaxis: Enoxaparin (Lovenox) SQ  Code Status: DNR / DNI    Darien Guillory      Primary Care Physician   Darien Guillory    Chief Complaint   Fall with weakness    History is obtained from the patient, electronic health record, emergency department physician and patient's spouse    History of Present Illness   Ricarda Bae is a 82 year old female who presents with a fall at secondary to progressive weakness. Her  states that she has been declining for the past one to two weeks with increasing swelling not responding to her diuretics and increasing confusion. She fell at home today and was brought to the emergency room for evaluation where she was noted to be in congestive heart failure with hypoxia. Her chest x-ray revealed florid heart failure and her D-dimer was elevated but her CT scan was unremarkable. Sh ewas started on IV diuretics and admitte dto the floor with supplemental oxygen.    Past Medical History    I have reviewed this patient's medical history and updated it with pertinent information if needed.   Past Medical History:   Diagnosis Date     Adenocarcinoma of colon (H) 12/05/2016     Atrial fibrillation (H)      Benign neoplasm of eyelid      Carotid artery stenosis      Carotid stenosis      CHF (congestive heart failure) (H)     Right     Chronic cough      Coronary artery disease      CVA (cerebral vascular accident) (H)      Dementia      HTN, goal below 130/80      Hyperlipidemia      Incipient senile cataract       Leaky heart valve     right atrium     Other generalized ischemic cerebrovascular disease      Unspecified cerebral artery occlusion with cerebral infarction        Past Surgical History   I have reviewed this patient's surgical history and updated it with pertinent information if needed.  Past Surgical History:   Procedure Laterality Date     BIOPSY BREAST       CAROTID ENDARTERECTOMY Right 04/11/2013     DECOMPRESSION, FUSION LUMBAR POSTERIOR THREE + LEVELS, COMBINED       ENDARTERECTOMY CAROTID  4/11/2013    Procedure: ENDARTERECTOMY CAROTID;  RIGHT CAROTID ENDARTERECTOMY WITH EEG;  Surgeon: Yo Bateman MD;  Location: [facility] OR     EXC SKIN MALIG 0.5CM OR LESS,FACIAL Left     Inferior eyelid     HEMICOLECTOMY, RT/LT Right 12/05/2016     LAPAROTOMY EXPLORATORY N/A 10/27/2016    Procedure: LAPAROTOMY EXPLORATORY;  Surgeon: Ananth Quijano MD;  Location: [facility] OR     RELEASE CARPAL TUNNEL Right 2004     REPLACE VALVE TRICUSPID  10/10/2013     THORACENTESIS Left 03/2014    Multiple       Prior to Admission Medications   Prior to Admission Medications   Prescriptions Last Dose Informant Patient Reported? Taking?   ASPIRIN PO 6/10/2017 at Unknown time  Yes Yes   Sig: Take 81 mg by mouth daily    BuPROPion HCl (WELLBUTRIN XL PO) 6/10/2017 at Unknown time  Yes Yes   Sig: Take 300 mg by mouth daily   FLORASTOR 250 MG capsule   Yes No   Sig: TAKE 1 CAPSULE BY MOUTH AT BEDTIME   FUROSEMIDE PO 6/10/2017 at Unknown time  Yes Yes   Sig: Take 40 mg by mouth daily   HYDROCHLOROTHIAZIDE PO   Yes No   Sig: Take 25 mg by mouth daily   HYDROcodone-acetaminophen (NORCO) 5-325 MG per tablet 6/9/2017 at Unknown time  No Yes   Sig: Take 1 tablet by mouth every 6 hours as needed for moderate to severe pain   MELATONIN PO 6/9/2017 at Unknown time  Yes Yes   Sig: Take 10 mg by mouth At Bedtime   alpha-d-galactosidase (BEANO) tablet   Yes No   Sig: Take 2-3 tablets by mouth 3 times daily as needed for intestinal gas (before meal that is  gas producing.)   diphenoxylate-atropine (LOMOTIL) 2.5-0.025 MG per tablet   Yes No   Sig: TAKE 1 TABLET BY MOUTH THREE TIMES DAILY AS NEEDED   hypromellose (ARTIFICIAL TEARS) 0.4 % SOLN Past Month at Unknown time  No Yes   Sig: Place 1 drop into both eyes every hour as needed for dry eyes   magnesium hydroxide (MILK OF MAGNESIA) 400 MG/5ML suspension   Yes No   Sig: Take 5 mLs by mouth daily as needed for constipation or heartburn   metoprolol (LOPRESSOR) 100 MG tablet 6/10/2017 at Unknown time  No Yes   Sig: Take 1 tablet (100 mg) by mouth 2 times daily   ondansetron (ZOFRAN-ODT) 4 MG disintegrating tablet   Yes No   Sig: DISSOLVE 1 TABLET ON TONGUE EVERY 6 HOURS AS NEEDED FOR NAUSEA   potassium chloride naman er (K-DUR) 6/10/2017 at Unknown time  Yes Yes   Sig: Take 40 mEq by mouth 2 times daily   psyllium 0.52 G capsule   Yes No   Sig: Take 1 capsule by mouth daily   warfarin (COUMADIN) 2.5 MG tablet 2017 at Unknown time  Yes Yes   Sig: Take as directed by [facility]   warfarin (COUMADIN) 5 MG tablet 2017 at Unknown time  Yes Yes   Sig: Take 5 mg by mouth daily Take 7.5mg Mon, Wed, Fri; 5 mg all other days or as directed by warfarin clinic   warfarin (COUMADIN) 5 MG tablet 2017 at Unknown time  No Yes   Si.5mg Mon,Wed,Fri and 5mg all other days or as directed by warfarin clinic      Facility-Administered Medications: None     Allergies   No Known Allergies  I have reviewed this patient's social history and updated it with pertinent information if needed. Ricarda BEEBE Kathia  reports that she quit smoking about 4 years ago. Her smoking use included Cigarettes. She has never used smokeless tobacco. She reports that she drinks about 3.5 oz of alcohol per week  She reports that she does not use illicit drugs.  Social History       Family History   I have reviewed this patient's family history and updated it with pertinent information if needed.   Family History   Problem Relation Age of Onset      Colon Cancer Sister      MENTAL ILLNESS Brother      Dementia       Review of Systems   C: NEGATIVE for fever, chills, change in weight  INTEGUMENTARY/SKIN: NEGATIVE for worrisome rashes, moles or lesions  E/M: NEGATIVE for ear, mouth and throat problems  RESP:POSITIVE for dyspnea on exertion and SOB/dyspnea  CV: NEGATIVE for chest pain, palpitations or peripheral edema  GI: NEGATIVE for nausea, abdominal pain, heartburn, or change in bowel habits  MUSCULOSKELETAL: NEGATIVE for significant arthralgias or myalgia and POSITIVE for increased peripheral edema  PSYCHIATRIC: POSITIVE forconcentration difficulty and impaired memory    Physical Exam   Temp: 99  F (37.2  C) Temp src: Tympanic BP: 139/88 Pulse: 79 Heart Rate: 79 Resp: 20 SpO2: 96 % O2 Device: Non-rebreather mask Oxygen Delivery: 10 LPM  Vital Signs with Ranges  Temp:  [99  F (37.2  C)] 99  F (37.2  C)  Pulse:  [79] 79  Heart Rate:  [74-80] 79  Resp:  [20] 20  BP: (112-147)/(69-90) 139/88  SpO2:  [77 %-96 %] 96 %  0 lbs 0 oz    Constitutional: awake, alert, cooperative, mild distress, appears stated age and mildly obese  ENT: normocepalic, without obvious abnormality  Respiratory: Mild respiratory distress, decreased air exchange and clear to auscultation  Cardiovascular: regular rate and rhythm and no murmur noted  GI: normal bowel sounds, soft, non-distended, non-tender, involuntary guarding absent and no masses palpated, no hepatosplenomegally  Skin: no bruising or bleeding and normal skin color, texture, turgor  Musculoskeletal: bilateral lower extremity pitting edema present  Neuropsychiatric: General: normal, calm and normal eye contact  Level of consciousness: alert / normal  Affect: normal  Orientation: oriented to self, place, time and situation  Memory and insight: impaired    Data   Results for orders placed or performed during the hospital encounter of 06/10/17 (from the past 24 hour(s))   CBC with platelets differential   Result Value Ref Range     WBC 7.2 4.0 - 11.0 10e9/L    RBC Count 4.94 3.8 - 5.2 10e12/L    Hemoglobin 11.1 (L) 11.7 - 15.7 g/dL    Hematocrit 38.4 35.0 - 47.0 %    MCV 78 78 - 100 fl    MCH 22.5 (L) 26.5 - 33.0 pg    MCHC 28.9 (L) 31.5 - 36.5 g/dL    RDW 21.6 (H) 10.0 - 15.0 %    Platelet Count 182 150 - 450 10e9/L    Diff Method Automated Method     % Neutrophils 80.4 %    % Lymphocytes 9.4 %    % Monocytes 7.3 %    % Eosinophils 1.5 %    % Basophils 0.1 %    % Immature Granulocytes 1.3 %    Nucleated RBCs 0 0 /100    Absolute Neutrophil 5.8 1.6 - 8.3 10e9/L    Absolute Lymphocytes 0.7 (L) 0.8 - 5.3 10e9/L    Absolute Monocytes 0.5 0.0 - 1.3 10e9/L    Absolute Eosinophils 0.1 0.0 - 0.7 10e9/L    Absolute Basophils 0.0 0.0 - 0.2 10e9/L    Abs Immature Granulocytes 0.1 0 - 0.4 10e9/L    Absolute Nucleated RBC 0.0     Anisocytosis Marked     Microcytes Present     Hypochromasia Present    CRP inflammation   Result Value Ref Range    CRP Inflammation 5.0 0.0 - 8.0 mg/L   D-Dimer (FV Range)   Result Value Ref Range    D-Dimer ng/mL >5250 (H) 0 - 300 ng/ml D-DU   Nt probnp inpatient (BNP)   Result Value Ref Range    N-Terminal Pro BNP Inpatient 31062 (H) 0 - 1800 pg/mL   Comprehensive metabolic panel   Result Value Ref Range    Sodium 142 133 - 144 mmol/L    Potassium 3.6 3.4 - 5.3 mmol/L    Chloride 104 94 - 109 mmol/L    Carbon Dioxide 33 (H) 20 - 32 mmol/L    Anion Gap 5 3 - 14 mmol/L    Glucose 113 (H) 70 - 99 mg/dL    Urea Nitrogen 21 7 - 30 mg/dL    Creatinine 0.73 0.52 - 1.04 mg/dL    GFR Estimate 77 >60 mL/min/1.7m2    GFR Estimate If Black >90   GFR Calc   >60 mL/min/1.7m2    Calcium 8.5 8.5 - 10.1 mg/dL    Bilirubin Total 0.8 0.2 - 1.3 mg/dL    Albumin 2.8 (L) 3.4 - 5.0 g/dL    Protein Total 6.0 (L) 6.8 - 8.8 g/dL    Alkaline Phosphatase 139 40 - 150 U/L    ALT 28 0 - 50 U/L    AST 32 0 - 45 U/L   Magnesium   Result Value Ref Range    Magnesium 1.9 1.6 - 2.3 mg/dL   Lactic acid   Result Value Ref Range    Lactic Acid 1.7  0.4 - 2.0 mmol/L   Troponin I   Result Value Ref Range    Troponin I ES 0.026 0.000 - 0.045 ug/L   TSH   Result Value Ref Range    TSH 3.81 0.40 - 4.00 mU/L   UA reflex to Microscopic and Culture   Result Value Ref Range    Color Urine Straw     Appearance Urine Clear     Glucose Urine Negative NEG mg/dL    Bilirubin Urine Negative NEG    Ketones Urine Negative NEG mg/dL    Specific Gravity Urine 1.005 1.003 - 1.035    Blood Urine Negative NEG    pH Urine 6.0 4.7 - 8.0 pH    Protein Albumin Urine Negative NEG mg/dL    Urobilinogen mg/dL Normal 0.0 - 2.0 mg/dL    Nitrite Urine Negative NEG    Leukocyte Esterase Urine Negative NEG    Source Midstream Urine

## 2017-06-11 NOTE — PLAN OF CARE
Face to face report given with opportunity to observe patient.    Report given to Marci Christensen RN  6/11/2017  12:32 AM

## 2017-06-12 ENCOUNTER — APPOINTMENT (OUTPATIENT)
Dept: ULTRASOUND IMAGING | Facility: HOSPITAL | Age: 82
DRG: 293 | End: 2017-06-12
Payer: MEDICARE

## 2017-06-12 LAB
ANION GAP SERPL CALCULATED.3IONS-SCNC: 4 MMOL/L (ref 3–14)
BACTERIA SPEC CULT: NORMAL
BUN SERPL-MCNC: 22 MG/DL (ref 7–30)
CALCIUM SERPL-MCNC: 8.5 MG/DL (ref 8.5–10.1)
CHLORIDE SERPL-SCNC: 98 MMOL/L (ref 94–109)
CO2 SERPL-SCNC: 36 MMOL/L (ref 20–32)
CREAT SERPL-MCNC: 0.74 MG/DL (ref 0.52–1.04)
CRP SERPL-MCNC: 62.7 MG/L (ref 0–8)
ERYTHROCYTE [DISTWIDTH] IN BLOOD BY AUTOMATED COUNT: 21.2 % (ref 10–15)
GFR SERPL CREATININE-BSD FRML MDRD: 75 ML/MIN/1.7M2
GLUCOSE SERPL-MCNC: 93 MG/DL (ref 70–99)
HCT VFR BLD AUTO: 39.5 % (ref 35–47)
HGB BLD-MCNC: 11.2 G/DL (ref 11.7–15.7)
INR PPP: 2.02 (ref 0.8–1.2)
MCH RBC QN AUTO: 22.4 PG (ref 26.5–33)
MCHC RBC AUTO-ENTMCNC: 28.4 G/DL (ref 31.5–36.5)
MCV RBC AUTO: 79 FL (ref 78–100)
MICRO REPORT STATUS: NORMAL
NT-PROBNP SERPL-MCNC: 6880 PG/ML (ref 0–1800)
PLATELET # BLD AUTO: 155 10E9/L (ref 150–450)
POTASSIUM SERPL-SCNC: 4.6 MMOL/L (ref 3.4–5.3)
RBC # BLD AUTO: 4.99 10E12/L (ref 3.8–5.2)
SODIUM SERPL-SCNC: 138 MMOL/L (ref 133–144)
SPECIMEN SOURCE: NORMAL
WBC # BLD AUTO: 5.1 10E9/L (ref 4–11)

## 2017-06-12 PROCEDURE — A9270 NON-COVERED ITEM OR SERVICE: HCPCS | Mod: GY | Performed by: FAMILY MEDICINE

## 2017-06-12 PROCEDURE — 83880 ASSAY OF NATRIURETIC PEPTIDE: CPT | Performed by: FAMILY MEDICINE

## 2017-06-12 PROCEDURE — 85027 COMPLETE CBC AUTOMATED: CPT | Performed by: FAMILY MEDICINE

## 2017-06-12 PROCEDURE — 36415 COLL VENOUS BLD VENIPUNCTURE: CPT | Performed by: FAMILY MEDICINE

## 2017-06-12 PROCEDURE — 12000000 ZZH R&B MED SURG/OB

## 2017-06-12 PROCEDURE — 93306 TTE W/DOPPLER COMPLETE: CPT | Mod: 26 | Performed by: INTERNAL MEDICINE

## 2017-06-12 PROCEDURE — 80048 BASIC METABOLIC PNL TOTAL CA: CPT | Performed by: FAMILY MEDICINE

## 2017-06-12 PROCEDURE — 85610 PROTHROMBIN TIME: CPT | Performed by: FAMILY MEDICINE

## 2017-06-12 PROCEDURE — 93306 TTE W/DOPPLER COMPLETE: CPT | Mod: TC

## 2017-06-12 PROCEDURE — 25000128 H RX IP 250 OP 636: Performed by: FAMILY MEDICINE

## 2017-06-12 PROCEDURE — 25000132 ZZH RX MED GY IP 250 OP 250 PS 637: Mod: GY | Performed by: FAMILY MEDICINE

## 2017-06-12 PROCEDURE — 86140 C-REACTIVE PROTEIN: CPT | Performed by: FAMILY MEDICINE

## 2017-06-12 PROCEDURE — 40000275 ZZH STATISTIC RCP TIME EA 10 MIN

## 2017-06-12 RX ORDER — WARFARIN SODIUM 7.5 MG/1
7.5 TABLET ORAL
Status: COMPLETED | OUTPATIENT
Start: 2017-06-12 | End: 2017-06-12

## 2017-06-12 RX ORDER — FUROSEMIDE 10 MG/ML
20 INJECTION INTRAMUSCULAR; INTRAVENOUS ONCE
Status: COMPLETED | OUTPATIENT
Start: 2017-06-12 | End: 2017-06-12

## 2017-06-12 RX ADMIN — Medication 250 MG: at 08:22

## 2017-06-12 RX ADMIN — FUROSEMIDE 40 MG: 40 TABLET ORAL at 08:23

## 2017-06-12 RX ADMIN — WARFARIN SODIUM 7.5 MG: 7.5 TABLET ORAL at 18:08

## 2017-06-12 RX ADMIN — METOPROLOL TARTRATE 50 MG: 50 TABLET, FILM COATED ORAL at 08:23

## 2017-06-12 RX ADMIN — POTASSIUM CHLORIDE 40 MEQ: 20 TABLET, EXTENDED RELEASE ORAL at 20:41

## 2017-06-12 RX ADMIN — Medication 250 MG: at 20:41

## 2017-06-12 RX ADMIN — HYDROCODONE BITARTRATE AND ACETAMINOPHEN 1 TABLET: 5; 325 TABLET ORAL at 20:41

## 2017-06-12 RX ADMIN — POTASSIUM CHLORIDE 40 MEQ: 20 TABLET, EXTENDED RELEASE ORAL at 08:22

## 2017-06-12 RX ADMIN — ASPIRIN 81 MG 81 MG: 81 TABLET ORAL at 08:23

## 2017-06-12 RX ADMIN — Medication 10 MG: at 20:49

## 2017-06-12 RX ADMIN — FUROSEMIDE 20 MG: 10 INJECTION, SOLUTION INTRAVENOUS at 08:22

## 2017-06-12 RX ADMIN — METOPROLOL TARTRATE 50 MG: 50 TABLET, FILM COATED ORAL at 20:41

## 2017-06-12 RX ADMIN — Medication 0.52 G: at 08:22

## 2017-06-12 RX ADMIN — HYDROCHLOROTHIAZIDE 25 MG: 25 TABLET ORAL at 08:22

## 2017-06-12 RX ADMIN — BUPROPION HYDROCHLORIDE 300 MG: 300 TABLET, EXTENDED RELEASE ORAL at 08:22

## 2017-06-12 RX ADMIN — HYDROCODONE BITARTRATE AND ACETAMINOPHEN 1 TABLET: 5; 325 TABLET ORAL at 14:30

## 2017-06-12 RX ADMIN — HYDROCODONE BITARTRATE AND ACETAMINOPHEN 1 TABLET: 5; 325 TABLET ORAL at 05:53

## 2017-06-12 ASSESSMENT — PAIN DESCRIPTION - DESCRIPTORS: DESCRIPTORS: SORE

## 2017-06-12 NOTE — PROGRESS NOTES
[facility]    Family Practice Progress Note    Date of Service (when I saw the patient): 06/12/2017     Assessment & Plan   Ricarda Bae is a 82 year old female who was admitted on 6/10/2017.     Principal Problem:    Acute on chronic congestive heart failure with right ventricular diastolic dysfunction (H)    Assessment: improving but still requiring oxygen    Plan: continue diuresis  Active Problems:    Essential hypertension    Assessment: controlled    Plan: monitor    Anemia    Assessment: stable    Plan: monitor    Long-term (current) use of anticoagulants [Z79.01]    Assessment: therapeutic    Plan: monitor, adjust warfarin      DVT Prophylaxis: Warfarin  Code Status: DNR / DNI    Darien Guillory    Interval History   Doing well. Continues to improve. Pain is well-controlled. No fevers. Using less oxygen. Hgb stable.    Review Of Systems  CONSTITUTIONAL:  negative for  fevers  HEENT:  negative  RESPIRATORY:  positive for  dyspnea  negative for  cough with sputum and wheezing  CARDIOVASCULAR:  negative for  chest pain, palpitations  GASTROINTESTINAL:  negative for nausea, vomiting, diarrhea, constipation and abdominal pain  GENITOURINARY:  negative for frequency and dysuria  INTEGUMENT/BREAST:  negative for rash and skin lesion(s)  MUSCULOSKELETAL:  negative for  decreased range of motion  BEHAVIOR/PSYCH:  positive for poor concentration      Physical Exam   Temp: 98.9  F (37.2  C) Temp src: Tympanic BP: 136/69   Heart Rate: 74 Resp: 18 SpO2: 94 % O2 Device: Nasal cannula Oxygen Delivery: 3 LPM  Vitals:    06/10/17 2150   Weight: 61.3 kg (135 lb 2.3 oz)     Vital Signs with Ranges  Temp:  [97.2  F (36.2  C)-99.2  F (37.3  C)] 98.9  F (37.2  C)  Heart Rate:  [72-77] 74  Resp:  [16-18] 18  BP: (111-136)/(59-71) 136/69  SpO2:  [94 %-95 %] 94 %  I/O last 3 completed shifts:  In: 1380 [P.O.:1380]  Out: 1900 [Urine:1900]    Peripheral IV 06/10/17 Right (Active)   Site Assessment WDL 6/11/2017 11:54 PM    Line Status Saline locked 6/11/2017 11:54 PM   Phlebitis Scale 0-->no symptoms 6/11/2017 11:54 PM   Infiltration Scale 0 6/11/2017 11:54 PM   Infiltration Site Treatment Method  None 6/11/2017 12:00 AM   Extravasation? No 6/11/2017 12:00 AM   Dressing Intervention New dressing  6/11/2017 12:00 AM   Number of days:2       Incision/Surgical Site 10/27/16 Abdomen (Active)   Number of days:228     Line/device assessment(s) completed for medical necessity    Constitutional: awake, alert, cooperative, no apparent distress, appears stated age and normal weight  ENT: normocepalic, without obvious abnormality  Respiratory: no increased work of breathing, good air exchange and clear to auscultation  Cardiovascular: regular rate and rhythm and no murmur noted  GI: normal bowel sounds, soft and non-tender  Skin: normal skin color, texture, turgor, no redness, warmth, or swelling and no rashes  Musculoskeletal: there is no redness, warmth, or swelling of the joints  full range of motion noted  Neuropsychiatric: General: normal, calm and normal eye contact  Level of consciousness: alert / normal  Affect: normal and pleasant  Orientation: oriented to self, place, time and situation  Memory and insight: normal, memory for past and recent events intact and thought process normal    Medications     Warfarin Therapy Reminder         furosemide  20 mg Intravenous Once     sodium chloride (PF)  3 mL Intracatheter Q8H     aspirin chewable tablet 81 mg  81 mg Oral Daily     buPROPion (WELLBUTRIN XL) 24 hr tablet 300 mg  300 mg Oral Daily     saccharomyces boulardii  250 mg Oral BID     furosemide (LASIX) tablet 40 mg  40 mg Oral Daily     hydrochlorothiazide (HYDRODIURIL) tablet 25 mg  25 mg Oral Daily     melatonin tablet 10 mg  10 mg Oral At Bedtime     potassium chloride naman er  40 mEq Oral BID     psyllium  1 capsule Oral Daily     metoprolol  50 mg Oral BID       Data     Recent Labs  Lab 06/12/17  0510 06/11/17  0516  06/10/17  1922 06/09/17   WBC 5.1 6.2 7.2  --    HGB 11.2* 10.6* 11.1*  --    MCV 79 77* 78  --     165 182  --    INR  --  2.08*  --  3.7*    143 142  --    POTASSIUM 4.6 3.6 3.6  --    CHLORIDE 98 102 104  --    CO2 36* 34* 33*  --    BUN 22 19 21  --    CR 0.74 0.74 0.73  --    ANIONGAP 4 7 5  --    KIMBERLY 8.5 8.2* 8.5  --    GLC 93 102* 113*  --    ALBUMIN  --   --  2.8*  --    PROTTOTAL  --   --  6.0*  --    BILITOTAL  --   --  0.8  --    ALKPHOS  --   --  139  --    ALT  --   --  28  --    AST  --   --  32  --    TROPI  --   --  0.026  --

## 2017-06-12 NOTE — PLAN OF CARE
Face to face report given with opportunity to observe patient.    Report given to asael luna   6/11/2017  11:07 PM

## 2017-06-12 NOTE — PROGRESS NOTES
"Pt referred via nutrition indicator list with weight loss.  82 yof admitted with CHF.  Hx noted.      Ht-68\", Wt-135#.  IBWR is 126-154#.  Weight hx notes:  (3/2016) 137#, (11/2016) 141#    Labs/meds reviewed.  Pt is taking Lasix and HCTZ which may lead to weight fluctuations.      Regular diet ordered.  Pt has consumed 50%, 75% two meals offered thus far.      No significant weight loss is noted.  Weight is within ideal range.  No nutrition interventions indicated at this time.      "

## 2017-06-12 NOTE — PLAN OF CARE
Problem: Patient Goal: Social Work Focus  Goal: 1. Patient Goal: Social Work Focus  Spoke with Leandro is [facility] about referral for Ricarda, she will need nursing and PT upon discharge.  Spoke with Ricarda about this, she is agreeable.  Dr. Guillory aware of discharge plan.

## 2017-06-12 NOTE — PLAN OF CARE
Problem: Patient Goal: Rt Focus  Goal: 1. Patient Goal: RT Focus  Pt will return to baseline respiratory status   No nebs given this shift. Breath sounds dim and clear. SpO2 on 3L 94%

## 2017-06-12 NOTE — PLAN OF CARE
Problem: Goal Outcome Summary  Goal: Goal Outcome Summary  Outcome: Improving  A&O. Afebrile. Remains on 3 LPM O2 via NC with Sats 94%. Vitals as charted. Denies SOB or ACEVEDO. Ax1 with gait belt and walker. Patient incontinent x1 and voided in toilet x1 but missed the hat, urine willy. Heart rate irregular. Blanchable redness to buttock, barrier cream applied and patient encouraged to change positions/discussed risks for pressure wounds. LE red/scaly. Toenails thick and overgrown. IV saline locked. Lungs diminished. Bowel sounds active x4. Received PRN Norco once this shift for pain with decrease in pain. Patient on regular diet. Call light within reach, makes needs known.      Face to face report given with opportunity to observe patient.     Report given to Nicky Anderson   6/12/2017  7:15 AM

## 2017-06-12 NOTE — PLAN OF CARE
"Problem: Goal Outcome Summary  Goal: Goal Outcome Summary  Outcome: Improving  Pt reports generalized pain \"tightness/tense\" all over and continued with lower back pain which is not new for pt. Pt received norco x1 and roxycodone x1 this shift for pain and zanaflex x1 for muscle tightness. Pt able to use gait belt and walker SBA 1 to ambulate to bathroom and voiding without difficulty. Taking in food fluids with no coughing or nausea noted. Pt up in chair for part of shift then back in bed. Pt ao x4 no confusion noted. Makes needs known and uses call light approprietly.     Problem: Cardiac: Heart Failure (Adult)  Goal: Signs and Symptoms of Listed Potential Problems Will be Absent or Manageable (Cardiac: Heart Failure)  Signs and symptoms of listed potential problems will be absent or manageable by discharge/transition of care (reference Cardiac: Heart Failure (Adult) CPG).   Outcome: Improving    06/11/17 1611   Cardiac: Heart Failure   Problems Assessed (Heart Failure) all   Problems Present (Heart Failure) dysrhythmia/arrhythmia;fluid/electrolyte imbalance;respiratory compromise         Problem: Fall Risk (Adult)  Goal: Identify Related Risk Factors and Signs and Symptoms  Related risk factors and signs and symptoms are identified upon initiation of Human Response Clinical Practice Guideline (CPG)   Outcome: Improving    06/11/17 1438   Fall Risk   Fall Risk: Related Risk Factors age-related changes;culprit medication(s);history of falls;gait/mobility problems       Goal: Absence of Falls  Patient will demonstrate the desired outcomes by discharge/transition of care.   Outcome: Improving    06/11/17 1438   Fall Risk (Adult)   Absence of Falls making progress toward outcome           "

## 2017-06-13 VITALS
HEART RATE: 65 BPM | OXYGEN SATURATION: 88 % | SYSTOLIC BLOOD PRESSURE: 119 MMHG | WEIGHT: 135.14 LBS | BODY MASS INDEX: 20.48 KG/M2 | RESPIRATION RATE: 16 BRPM | HEIGHT: 68 IN | DIASTOLIC BLOOD PRESSURE: 58 MMHG | TEMPERATURE: 96.8 F

## 2017-06-13 LAB
ANION GAP SERPL CALCULATED.3IONS-SCNC: 5 MMOL/L (ref 3–14)
BUN SERPL-MCNC: 21 MG/DL (ref 7–30)
CALCIUM SERPL-MCNC: 8.4 MG/DL (ref 8.5–10.1)
CHLORIDE SERPL-SCNC: 97 MMOL/L (ref 94–109)
CO2 SERPL-SCNC: 35 MMOL/L (ref 20–32)
CREAT SERPL-MCNC: 0.65 MG/DL (ref 0.52–1.04)
CRP SERPL-MCNC: 43.6 MG/L (ref 0–8)
ERYTHROCYTE [DISTWIDTH] IN BLOOD BY AUTOMATED COUNT: 20.8 % (ref 10–15)
GFR SERPL CREATININE-BSD FRML MDRD: 87 ML/MIN/1.7M2
GLUCOSE SERPL-MCNC: 83 MG/DL (ref 70–99)
HCT VFR BLD AUTO: 38.5 % (ref 35–47)
HGB BLD-MCNC: 11.2 G/DL (ref 11.7–15.7)
INR PPP: 1.93 (ref 0.8–1.2)
MCH RBC QN AUTO: 22.7 PG (ref 26.5–33)
MCHC RBC AUTO-ENTMCNC: 29.1 G/DL (ref 31.5–36.5)
MCV RBC AUTO: 78 FL (ref 78–100)
PLATELET # BLD AUTO: 158 10E9/L (ref 150–450)
POTASSIUM SERPL-SCNC: 4.3 MMOL/L (ref 3.4–5.3)
RBC # BLD AUTO: 4.94 10E12/L (ref 3.8–5.2)
SODIUM SERPL-SCNC: 137 MMOL/L (ref 133–144)
WBC # BLD AUTO: 4.9 10E9/L (ref 4–11)

## 2017-06-13 PROCEDURE — 86140 C-REACTIVE PROTEIN: CPT | Performed by: FAMILY MEDICINE

## 2017-06-13 PROCEDURE — 80048 BASIC METABOLIC PNL TOTAL CA: CPT | Performed by: FAMILY MEDICINE

## 2017-06-13 PROCEDURE — A9270 NON-COVERED ITEM OR SERVICE: HCPCS | Mod: GY | Performed by: FAMILY MEDICINE

## 2017-06-13 PROCEDURE — 25000132 ZZH RX MED GY IP 250 OP 250 PS 637: Mod: GY | Performed by: FAMILY MEDICINE

## 2017-06-13 PROCEDURE — 40000275 ZZH STATISTIC RCP TIME EA 10 MIN

## 2017-06-13 PROCEDURE — 94760 N-INVAS EAR/PLS OXIMETRY 1: CPT

## 2017-06-13 PROCEDURE — 85027 COMPLETE CBC AUTOMATED: CPT | Performed by: FAMILY MEDICINE

## 2017-06-13 PROCEDURE — 36415 COLL VENOUS BLD VENIPUNCTURE: CPT | Performed by: FAMILY MEDICINE

## 2017-06-13 PROCEDURE — 85610 PROTHROMBIN TIME: CPT | Performed by: FAMILY MEDICINE

## 2017-06-13 RX ADMIN — Medication 250 MG: at 08:47

## 2017-06-13 RX ADMIN — OXYCODONE HYDROCHLORIDE 5 MG: 5 TABLET ORAL at 00:38

## 2017-06-13 RX ADMIN — Medication 0.52 G: at 08:47

## 2017-06-13 RX ADMIN — FUROSEMIDE 40 MG: 40 TABLET ORAL at 08:47

## 2017-06-13 RX ADMIN — POTASSIUM CHLORIDE 40 MEQ: 20 TABLET, EXTENDED RELEASE ORAL at 08:47

## 2017-06-13 RX ADMIN — HYDROCHLOROTHIAZIDE 25 MG: 25 TABLET ORAL at 08:47

## 2017-06-13 RX ADMIN — ASPIRIN 81 MG 81 MG: 81 TABLET ORAL at 08:47

## 2017-06-13 RX ADMIN — BUPROPION HYDROCHLORIDE 300 MG: 300 TABLET, EXTENDED RELEASE ORAL at 08:47

## 2017-06-13 RX ADMIN — METOPROLOL TARTRATE 50 MG: 50 TABLET, FILM COATED ORAL at 08:47

## 2017-06-13 RX ADMIN — HYDROCODONE BITARTRATE AND ACETAMINOPHEN 1 TABLET: 5; 325 TABLET ORAL at 05:19

## 2017-06-13 RX ADMIN — OXYCODONE HYDROCHLORIDE 5 MG: 5 TABLET ORAL at 07:53

## 2017-06-13 ASSESSMENT — PAIN DESCRIPTION - DESCRIPTORS
DESCRIPTORS: DULL;SORE
DESCRIPTORS: SORE
DESCRIPTORS: SORE

## 2017-06-13 NOTE — PLAN OF CARE
Problem: Patient Goal: Social Work Focus  Goal: 1. Patient Goal: Social Work Focus  Outcome: Adequate for Discharge Date Met:  06/13/17  Name: Ricarda Bae     MRN#: [id number]     Reason for Hospitalization: Hypoxia [R09.02]  Acute systolic congestive heart failure (H) [I50.21]  Acute bilateral thoracic back pain [M54.6]  Edema, unspecified type [R60.9]     SANDOR: average     Discharge Date: 6/13/2017     Patient / Family response to discharge plan: agreeable     Follow-Up Appt: Future Appointments  Date Time Provider Department Center   6/19/2017 11:00 AM HC ANTI COAGULATION [facility]   10/10/2017 1:00 PM HC PACEMAKER [facility]         Other Providers (Care Coordinator, County Services, PCA services etc): Yes: Leandro- [facility]     Discharge Disposition: home with palliative care.  Met with Ricarda and son, Dakotah.  They understand the plan for [facility] to come in and are agreeable.  RT is to arrange for home oxygen for Ricarda.  Dakotah will provide transportation home.  Johana RN and Deepa RN aware of discharge plan.  Spoke with Leandro at [facility] in regards to discharge.       Judy Del Toro

## 2017-06-13 NOTE — DISCHARGE INSTRUCTIONS
AN APPOINTMENT HAS BEEN SCHEDULED FOR YOU ON Thursday June 22ND AT 1000 AM WITH DR. SWARTZ. IF THIS APPOINTMENT DOES NOT WORK FOR YOU PLEASE CALL [phone] TO RESCHEDULE. THANKS

## 2017-06-13 NOTE — PLAN OF CARE
Problem: Patient Goal: Rt Focus  Goal: 1. Patient Goal: RT Focus  Pt will return to baseline respiratory status   Sp02 93% on 2 lpm NC. Dry NPC. BS clear/dim bases. No resp distress/ no nebs given this shift.

## 2017-06-13 NOTE — PLAN OF CARE
Problem: Goal Outcome Summary  Goal: Goal Outcome Summary  Outcome: Improving  A&O. VSS O2 weaned to 2 L NC maintaining 90-94%. Continues to report low back pain, has been medicated end of day shift with little relief. Declines pain med this shift until HS at which time requests/medicated with 1 norco , sleeps. Up to commode with SBA. Visitors this PM.  Face to face report given with opportunity to observe patient.    Report given to Judy Amezcua   6/12/2017  11:18 PM        Problem: Cardiac: Heart Failure (Adult)  Goal: Signs and Symptoms of Listed Potential Problems Will be Absent or Manageable (Cardiac: Heart Failure)  Signs and symptoms of listed potential problems will be absent or manageable by discharge/transition of care (reference Cardiac: Heart Failure (Adult) CPG).   Outcome: Improving    06/12/17 2156   Cardiac: Heart Failure   Problems Assessed (Heart Failure) all   Problems Present (Heart Failure) dysrhythmia/arrhythmia;respiratory compromise         Problem: Fall Risk (Adult)  Goal: Identify Related Risk Factors and Signs and Symptoms  Related risk factors and signs and symptoms are identified upon initiation of Human Response Clinical Practice Guideline (CPG)   Outcome: No Change    06/11/17 1438   Fall Risk   Fall Risk: Related Risk Factors       06/11/17 1438   Fall Risk   Fall Risk: Related Risk Factors age-related changes;culprit medication(s);history of falls;gait/mobility problems     age-related changes;culprit medication(s);history of falls;gait/mobility problems       Goal: Absence of Falls  Patient will demonstrate the desired outcomes by discharge/transition of care.   Outcome: No Change    06/12/17 2156   Fall Risk (Adult)   Absence of Falls making progress toward outcome

## 2017-06-13 NOTE — PLAN OF CARE
"Problem: Goal Outcome Summary  Goal: Goal Outcome Summary  1. Edema will decrease by discharge. 2. Pain will be under control by discharge.  Outcome: Adequate for Discharge Date Met:  06/13/17  Patient discharged at 1:55 PM via wheel chair accompanied by spouse and son and staff. Prescriptions sent to patients preferred pharmacy. All belongings sent with patient.      Discharge instructions reviewed with Son & patient. Listed belongings gathered and returned to patient. Clothes, shoes     Patient discharged to home.   Report called to Home Care:  [facility] at 10:22 am      Core Measures and Vaccines  Core Measures applicable during stay: Yes. If yes, state diagnosis: Heart Failure  Pneumonia and Influenza given prior to discharge, if indicated: N/A     Surgical Patient   Surgical Procedures during stay: N/A  Did patient receive discharge instruction on wound care and recognition of infection symptoms? N/A     MISC  Follow up appointment made:  Yes  Home and hospital aquired medications returned to patient: N/A  Patient reports pain was well managed at discharge: {YES / NO:[id number]::\"Yes      "

## 2017-06-13 NOTE — PROGRESS NOTES
Patient has been assessed for Home Oxygen needs. Oxygen readings:    *Pulse oximetry (SpO2) = 86% on room air at rest while awake.    *SpO2 improved to 93% on 1liters/minute at rest.

## 2017-06-13 NOTE — DISCHARGE SUMMARY
[facility]    Discharge Summary  [facility]    Date of Admission:  6/10/2017  Date of Discharge:  6/13/2017  Discharging Provider: Darien Guillory  Date of Service (when I saw the patient): 06/13/17    Discharge Diagnoses   Principal Problem:    Acute on chronic congestive heart failure with right ventricular diastolic dysfunction (H)  Active Problems:    A-fib (H)    Essential hypertension    Anemia    Long-term (current) use of anticoagulants [Z79.01]      History of Present Illness   Ricarda Bae is an 82 year old female who presented with a fall and weakness    Hospital Course   Ricarda Bae was admitted on 6/10/2017.  The following problems were addressed during her hospitalization:    Principal Problem:    Acute on chronic congestive heart failure with right ventricular diastolic dysfunction (H)    Assessment: improved    Plan: discharge  Active Problems:    A-fib (H)    Assessment: chronic, therapeutically anticoagulated    Plan: discharge    Essential hypertension    Assessment: controlled    Plan: discharge    Anemia    Assessment: chronic, stable    Plan: discharge    Long-term (current) use of anticoagulants [Z79.01]    Assessment: therapeutic    Plan: discharge      Darien Guillory    Significant Results and Procedures   BNP 6880    Pending Results   These results will be followed up by Darien Guillory MD    Unresulted Labs Ordered in the Past 30 Days of this Admission     No orders found from 4/11/2017 to 6/11/2017.          Code Status   DNR / DNI    Primary Care Physician   Darien Guillory    Physical Exam   Temp: 96.8  F (36  C) Temp src: Tympanic BP: 119/58 Pulse: 65 Heart Rate: 74 Resp: 16 SpO2: (!) 88 % O2 Device: Nasal cannula Oxygen Delivery: 1 LPM  Vitals:    06/10/17 2150   Weight: 61.3 kg (135 lb 2.3 oz)     Vital Signs with Ranges  Temp:  [96.8  F (36  C)-98.2  F (36.8  C)] 96.8  F (36  C)  Pulse:  [65-75] 65  Heart Rate:  [73-82] 74  Resp:  [14-18] 16  BP:  (106-144)/(56-81) 119/58  SpO2:  [86 %-98 %] 88 %  I/O last 3 completed shifts:  In: 603 [P.O.:600; I.V.:3]  Out: -     Constitutional: awake, alert, cooperative, no apparent distress, appears older than stated age and normal weight  ENT: normocepalic, without obvious abnormality  Respiratory: no increased work of breathing, decreased air exchange and clear to auscultation  Cardiovascular: irregularly irregular rhythm and no murmur noted  GI: normal bowel sounds, soft and non-tender  Skin: no bruising or bleeding and normal skin color, texture, turgor  Musculoskeletal: bilateral lower extremity pitting edema present  there is no redness, warmth, or swelling of the joints  Neuropsychiatric: General: normal, calm and normal eye contact  Level of consciousness: alert / normal  Affect: normal  Orientation: oriented to self, place, time and situation  Memory and insight: normal, memory for past and recent events intact and thought process normal    Time Spent on this Encounter   I, Darien Guillory, personally saw the patient today and spent greater than 30 minutes discharging this patient.    Discharge Disposition   Discharged to home  Condition at discharge: Stable    Consultations This Hospital Stay   PHARMACY TO DOSE MEDICATION    Discharge Orders     Home care nursing referral     Home Care PT Referral for Hospital Discharge     Home Care Social Service Referral for Hospital Discharge     Reason for your hospital stay   Patient is an 82 year old female who was admitted with weakness after a fall at home. She was brought to the emergency room where she was seen and evaluated. Her evaluation revealed no acute fractures but she was weak and unable to return to home. She had increased heart failure with a BNP of greater than 13,000. She was also hypoxic and was subsequently treated with supplemental oxygen and was diuresed. Clinically she improved but continued to have low dose oxygen requirements. Her BNP improved to  just under 7,000 and her chronic atrial fibrillation remained rate controlled. She remained therapeutically anticoagulated during her stay. She was discharged to home with additional services including palliative care, nursing and oxygen.     Follow-up and recommended labs and tests    Follow up with primary care provider, Darien Guillory, within 7 days for hospital follow- up.  No follow up labs or test are needed.     Activity   Your activity upon discharge: activity as tolerated     MD face to face encounter   Documentation of Face to Face and Certification for Home Health Services    I certify that patient: Ricarda Bae is under my care and that I, or a nurse practitioner or physician's assistant working with me, had a face-to-face encounter that meets the physician face-to-face encounter requirements with this patient on: 6/13/2017.    This encounter with the patient was in whole, or in part, for the following medical condition, which is the primary reason for home health care: chronic atrial fibrillation with right sided congestive heart failure.    I certify that, based on my findings, the following services are medically necessary home health services: Nursing, Occupational Therapy, Physical Therapy and Social Work.    My clinical findings support the need for the above services because: Nurse is needed: To assess cardiovascular status after changes in medications or other medical regimen., To provide assessment and oversight required in the home to assure adherence to the medical plan due to: progression of disease. and To provide caregiver training to assist with: supplemental oxygen and medications, Physical Therapy Services are needed to assess and treat the following functional impairments: transfer and ambulation and Social Work to assist with ongoing home needs    Further, I certify that my clinical findings support that this patient is homebound (i.e. absences from home require considerable and  taxing effort and are for medical reasons or Judaism services or infrequently or of short duration when for other reasons) because: Requires assistance of another person or specialized equipment to access medical services because patient: Is unable to walk greater than 30 feet without rest.    Based on the above findings. I certify that this patient is confined to the home and needs intermittent skilled nursing care, physical therapy and/or speech therapy.  The patient is under my care, and I have initiated the establishment of the plan of care.  This patient will be followed by a physician who will periodically review the plan of care.  Physician/Provider to provide follow up care: Darien Guillory    Attending hospital physician (the Medicare certified [facility] provider): Darien Guillory  Physician Signature: See electronic signature associated with these discharge orders.  Date: 6/13/2017     DNR/DNI     Oxygen Adult   [place] [facility]xygen Order 1 liter(s) by nasal cannula continuously with use of portable tank. Expected treatment length is indefinite (99 months).. Test on conserving device as applicable.    Patients who qualify for home O2 coverage under the CMS guidelines require ABG tests or O2 sat readings obtained closest to, but no earlier than 2 days prior to the discharge, as evidence of the need for home oxygen therapy. Testing must be performed while patient is in the chronic stable state. See notes for O2 sats.    I certify that this patient, Ricarda Bae has been under my care and that I, or a nurse practitioner or physician's assistant working with me, had a face-to-face encounter that meets the face-to-face encounter requirements with this patient on 6/13/2017. The patient, Ricarda Bae was evaluated or treated in whole, or in part, for the following medical condition, which necessitates the use of the ordered oxygen. Treatment Diagnosis: acute on chronic right sided congestive heart failure, chronic  atrial fibrillation    Attending Provider: Darien Guillory  Physician signature: See electronic signature associated with these discharge orders  Date of Order: June 13, 2017     Diet   Follow this diet upon discharge:      Combination Diet Regular Diet Adult       Discharge Medications   Current Discharge Medication List      CONTINUE these medications which have NOT CHANGED    Details   !! warfarin (COUMADIN) 5 MG tablet 7.5mg Mon,Wed,Fri and 5mg all other days or as directed by warfarin clinic  Qty: 35 tablet, Refills: 3    Associated Diagnoses: Long term current use of anticoagulant therapy      !! warfarin (COUMADIN) 5 MG tablet Take 5 mg by mouth daily Take 7.5mg Mon, Wed, Fri; 5 mg all other days or as directed by warfarin clinic  Qty: 30 tablet, Refills: 3    Associated Diagnoses: Long-term (current) use of anticoagulants; S/P carotid endarterectomy; History of stroke      !! warfarin (COUMADIN) 2.5 MG tablet Take as directed by [facility]  Qty: 30 tablet, Refills: 11    Associated Diagnoses: Permanent atrial fibrillation (H)      FUROSEMIDE PO Take 40 mg by mouth daily      diphenoxylate-atropine (LOMOTIL) 2.5-0.025 MG per tablet TAKE 1 TABLET BY MOUTH two times DAILY AS NEEDED  Refills: 0      hypromellose (ARTIFICIAL TEARS) 0.4 % SOLN Place 1 drop into both eyes every hour as needed for dry eyes  Qty: 1 Bottle, Refills: 0    Associated Diagnoses: Dry eyes, bilateral      BuPROPion HCl (WELLBUTRIN XL PO) Take 300 mg by mouth daily      HYDROcodone-acetaminophen (NORCO) 5-325 MG per tablet Take 1 tablet by mouth every 6 hours as needed for moderate to severe pain  Qty: 15 tablet, Refills: 0    Associated Diagnoses: Intussusception of colon (H)      MELATONIN PO Take 10 mg by mouth At Bedtime      metoprolol (LOPRESSOR) 100 MG tablet Take 1 tablet (100 mg) by mouth 2 times daily  Qty: 60 tablet, Refills: 11    Associated Diagnoses: Hypertension      ASPIRIN PO Take 81 mg by mouth daily        HYDROCHLOROTHIAZIDE PO Take 25 mg by mouth daily      FLORASTOR 250 MG capsule TAKE 1 CAPSULE BY MOUTH AT BEDTIME  Refills: 11      ondansetron (ZOFRAN-ODT) 4 MG disintegrating tablet DISSOLVE 1 TABLET ON TONGUE EVERY 6 HOURS AS NEEDED FOR NAUSEA  Refills: 11      psyllium 0.52 G capsule Take 1 capsule by mouth daily      magnesium hydroxide (MILK OF MAGNESIA) 400 MG/5ML suspension Take 5 mLs by mouth daily as needed for constipation or heartburn       !! - Potential duplicate medications found. Please discuss with provider.      STOP taking these medications       potassium chloride naman er (K-DUR) Comments:   Reason for Stopping:             Allergies   No Known Allergies  Data   Most Recent 3 CBC's:  Recent Labs   Lab Test  06/13/17 0518 06/12/17 0510  06/11/17   0516   WBC  4.9  5.1  6.2   HGB  11.2*  11.2*  10.6*   MCV  78  79  77*   PLT  158  155  165      Most Recent 3 BMP's:  Recent Labs   Lab Test  06/13/17 0518 06/12/17 0510 06/11/17   0516   NA  137  138  143   POTASSIUM  4.3  4.6  3.6   CHLORIDE  97  98  102   CO2  35*  36*  34*   BUN  21  22  19   CR  0.65  0.74  0.74   ANIONGAP  5  4  7   KIMBERLY  8.4*  8.5  8.2*   GLC  83  93  102*     Most Recent 2 LFT's:  Recent Labs   Lab Test  06/10/17   1922 11/27/16   1810   AST  32  17   ALT  28  17   ALKPHOS  139  117   BILITOTAL  0.8  0.4     Most Recent INR's and Anticoagulation Dosing History:  Anticoagulation Dose History     Recent Dosing and Labs Latest Ref Rng & Units 5/12/2017 5/19/2017 5/31/2017 6/9/2017 6/11/2017 6/12/2017 6/13/2017    Warfarin 5 mg - - - - - 5 mg - -    Warfarin 7.5 mg - - - - - - 7.5 mg -    INR 0.80 - 1.20 - - - - 2.08(H) 2.02(H) 1.93(H)    INR 0.86 - 1.14 1.8(A) 1.5(A) 1.6(A) 3.7(A) - - -        Most Recent 3 Troponin's:  Recent Labs   Lab Test  06/10/17   1922  04/20/15   1520   TROPI  0.026  <0.015  The 99th percentile for upper reference range is 0.045 ug/L.  Troponin values in   the range of 0.045 - 0.120 ug/L may  be associated with risks of adverse   clinical events.       Most Recent Cholesterol Panel:  Recent Labs   Lab Test  10/31/16   0525  04/11/13   0755   CHOL   --   153   LDL   --   78   HDL   --   48*   TRIG  75  136     Most Recent 6 Bacteria Isolates From Any Culture (See EPIC Reports for Culture Details):  Recent Labs   Lab Test  06/11/17   0523  10/20/16   1500  04/20/15   1045   CULT  No MRSA isolated  No MRSA isolated  No MRSA isolated     Most Recent TSH, T4 and A1c Labs:  Recent Labs   Lab Test  06/10/17   1922  04/11/13   0755   TSH  3.81   --    A1C   --   5.3     Results for orders placed or performed during the hospital encounter of 06/10/17   XR Chest 2 Views    Narrative    CHEST TWO VIEWS    COMPARISON:  Today's study is compared to a prior examination which is  dated October 27, 2016.    FINDINGS:  There has been a median sternotomy and there is a  prosthetic valve.  Dual-lead pacemaker is in place.  Heart is  enlarged.  There is mild vascular congestion and interstitial edema.  There are small bilateral pleural effusions with focal airspace  disease at both lung bases.  There is chronic rotator cuff disease  bilaterally.    There has been spinal fusion.    IMPRESSION:  BILATERAL PLEURAL EFFUSIONS WITH ADJACENT AIRSPACE  DISEASE, PROBABLY DUE TO MILD CONGESTIVE HEART FAILURE AS THE HEART IS  ALSO ENLARGED AND THERE IS SOME MILD VASCULAR CONGESTION.  Exam Date: Justin 10, 2017 08:19:00 PM  Author: DIOR FIGUEROA  This report is final and signed     CT Thoracic Spine w/o Contrast    Narrative    THORACIC SPINE CT    TECHNIQUE:  CT examination of the thoracic spine with sagittal and  coronal reconstructions was performed.    FINDINGS:  There are mild compression fractures of T8 and T11 of  uncertain age.  The vertebral arches are intact.  There are bilateral  pleural effusions, larger on the right than the left.  No intradural  abnormalities are seen.    IMPRESSION:  T8 AND T11 COMPRESSION FRACTURES OF  UNCERTAIN AGE.  Exam Date: Justin 10, 2017 08:15:27 PM  Author: NEMESIO SOTO  This report is final and signed     CT Lumbar Spine w/o Contrast    Narrative    LUMBAR SPINE CT    FINDINGS:  There has been fusion of L2, L3, L4 and L5 with pedicle  screws and posterior rods.  There is decrease in height in the L1-L2  disk with gaseous degeneration.  No acute fractures or destructive  lesions are noted.  Postop laminectomy changes are noted at L3 and L4.  Postoperative changes are seen dorsally in the spine.  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    IMPRESSION:  NO ACUTE LUMBAR INJURY.  POSTOPERATIVE CHANGES WITH  FUSION FROM L2 TO L5.  Exam Date: Justin 10, 2017 08:15:46 PM  Author: NEMESIO SOTO  This report is final and signed     CT Head w/o Contrast    Narrative    HEAD CT    FINDINGS:  There is an old lacunar infarct seen in the basal ganglia  on the right.  There is white matter low density in both hemispheres,  consistent with small-vessel disease.  There is enlargement of the  ventricular system and cortical sulci, consistent with atrophy.  The  cranial vault is intact.  There are no skull fractures.  The paranasal  sinuses are clear.    IMPRESSION:  NO ACUTE BRAIN INJURY.  Exam Date: Justin 10, 2017 08:14:55 PM  Author: NEMESIO SOTO  This report is final and signed     CT Cervical Spine w/o Contrast    Narrative    CERVICAL SPINE CT    FINDINGS:  There are degenerative changes at the middle atlantoaxial  joint.  There are posterior osteophytes seen at C3-C4.  There is  forward subluxation of C4 on C5 due to severe facet joint degenerative  changes.  There is decrease in height in the C5-C6 and C6-C7 disks  with anterior and posterior osteophytes.  No fractures of the  vertebral bodies or arches are noted.  The prevertebral soft tissues  appear normal.    IMPRESSION:  MODERATE DEGENERATIVE CHANGES OF THE CERVICAL SPINE.  Exam Date: Justin 10, 2017  08:15:17 PM  Author: NEMESIO SOTO  This report is final and signed     CTA Angiogram Chest w/o & w Contrast    Narrative    CT ANGIOGRAM OF THE CHEST    TECHNIQUE:  Axial images were obtained with coronal and sagittal  reformatted images then generated.    FINDINGS:  There is no pulmonary embolus.  No enlarged lymph nodes are  seen in the mediastinum, shauna or axilla.  There is a large right and a  small-to-moderate sized left pleural effusion with adjacent patchy  density which is probably atelectasis.  The heart is enlarged.  In  particular, the left atrium is enlarged.  There is some  atherosclerotic calcification.    Limited images through the upper abdomen show no suspicious  abnormality.  There is multilevel degenerative change.  There are  median sternotomy wires.    IMPRESSION:  1.  NO PULMONARY EMBOLI.    2.  BILATERAL PLEURAL EFFUSIONS, RIGHT GREATER THAN LEFT WITH ADJACENT  ATELECTASIS.  HEART IS ENLARGED AND THERE MAY BE SOME ELEMENT OF  CONGESTIVE HEART FAILURE.    This report is in agreement with the preliminary report provided by  [facility].                      SIGNATURE PAGE ONLY  Exam Date: Justin 10, 2017 09:31:53 PM  Author: DIOR FIGUEROA  This report is final and signed     XR Chest 2 Views    Narrative    CHEST TWO VIEWS    COMPARISON:  Today's study is compared to a prior examination which is  dated Nicole 10, 2017.    FINDINGS:  There has been a median sternotomy and there is a  prosthetic valve.  Dual-lead pacemaker is in place.  Heart is  enlarged.  There are small bilateral pleural effusions with some  adjacent patchy infiltrate.  There is some mild vascular congestion  and findings may be due to mild congestive heart failure.  Exam Date: Jun 11, 2017 08:59:00 AM  Author: DIOR FIGUEROA  This report is final and signed       Greater than 30 minutes was taken to examine patient, arrange follow up and prepare orders and discharge papers.  CC: Dr. Darien Guillory MD  CC: Malathi  [facility]

## 2017-06-13 NOTE — PLAN OF CARE
Problem: Goal Outcome Summary  Goal: Goal Outcome Summary  Outcome: No Change  A&O. VSS, HR irregular. Afebrile. C/o generalized soreness, administered Hailey and Norco this shift with Pt sleeping on recheck. LS diminished. O2 of 2LPM maintaining 94%, O2 found off with sat decrease to 86% replaced 2LPM with quick rebound. BS active. BLE have noted redness and edema trace to +2. IV saline locked. Call light with in reach.      Face to face report given with opportunity to observe patient.    Report given to ERNA Fisher   6/13/2017  7:24 AM

## 2017-06-13 NOTE — PLAN OF CARE
Problem: Patient Goal: Rt Focus  Goal: 1. Patient Goal: RT Focus  Pt will return to baseline respiratory status   Outcome: No Change  Pt maintaining oxygen saturation >89% on 2L n/c  No nebs this shift

## 2017-06-13 NOTE — PLAN OF CARE
Problem: Cardiac: Heart Failure (Adult)  Goal: Signs and Symptoms of Listed Potential Problems Will be Absent or Manageable (Cardiac: Heart Failure)  Signs and symptoms of listed potential problems will be absent or manageable by discharge/transition of care (reference Cardiac: Heart Failure (Adult) CPG).   Outcome: Adequate for Discharge Date Met:  06/13/17  Patient states she is feeling better today, still sore from falls.  Plan for today is to discharge home on 1 LPM of oxygen via nasal canula after lunch.

## 2017-06-14 ENCOUNTER — TELEPHONE (OUTPATIENT)
Dept: CASE MANAGEMENT | Facility: HOSPITAL | Age: 82
End: 2017-06-14

## 2017-06-15 ENCOUNTER — TELEPHONE (OUTPATIENT)
Dept: CASE MANAGEMENT | Facility: HOSPITAL | Age: 82
End: 2017-06-15

## 2017-06-24 ENCOUNTER — DOCUMENTATION ONLY (OUTPATIENT)
Dept: FAMILY MEDICINE | Facility: OTHER | Age: 82
End: 2017-06-24

## 2017-06-29 NOTE — PROGRESS NOTES
PHYSICIAN CERTIFICATION OF TERMINAL ILLNESS FOR HOSPICE BENEFIT    June 24, 2017    Ricarda Bae    1935      Effective Date of Certification    Start Date:  06/24/2017      End Date:  09/21/2017    Terminal Diagnosis:    HF      Secondary Diagnoses:     Atrial Fibrillation     HTN      Prognosis Related Comorbidities:    Anemia    Depression      Narrative Statement:  Client suffers from end stage HF and is on maximal tolerated medical therapy.  She continues to decline. Client resides at home and requires assistance with ADL's.  Goals of symptom control will be met.  Because of the progressive nature of the illness and associated comorbidities, client qualifies for hospice care.             I certify that this patient is terminally ill and has a life expectancy of 6 months or less if the terminal illness runs its anticipated course.        Attestation:  I confirm that this narrative was composed by myself and is based on review of the medical record and/or examination of the patient.            Emanuel Saez MD

## 2017-07-03 ENCOUNTER — ANTICOAGULATION THERAPY VISIT (OUTPATIENT)
Dept: ANTICOAGULATION | Facility: OTHER | Age: 82
End: 2017-07-03

## 2017-07-03 DIAGNOSIS — Z98.890 S/P CAROTID ENDARTERECTOMY: ICD-10-CM

## 2017-07-03 DIAGNOSIS — Z79.01 LONG-TERM (CURRENT) USE OF ANTICOAGULANTS: ICD-10-CM

## 2017-07-03 DIAGNOSIS — Z86.73 HISTORY OF STROKE: ICD-10-CM

## 2017-07-03 NOTE — PROGRESS NOTES
ANTICOAGULATION FOLLOW-UP CLINIC VISIT    Patient Name:  Ricarda Bae  Date:  7/3/2017  Contact Type:  Telephone    SUBJECTIVE:     Patient Findings     Comments Message left on patient cell phone re: missed INR appointment. Requested patient call to schedule new appointment           OBJECTIVE    INR   Date Value Ref Range Status   06/13/2017 1.93 (H) 0.80 - 1.20 Final       ASSESSMENT / PLAN  No question data found.  Anticoagulation Summary as of 7/3/2017     INR goal 2.0-3.0   Today's INR No new INR was available at the time of this encounter.   Maintenance plan 7.5 mg (5 mg x 1.5) on Mon, Wed, Fri; 5 mg (5 mg x 1) all other days   Full instructions 7.5 mg on Mon, Wed, Fri; 5 mg all other days   Weekly total 42.5 mg   No change documented Gisela Vidal RN   Plan last modified Gisela Bateman RN (5/19/2017)   Next INR check 7/7/2017   Target end date Indefinite    Indications   A-fib (H) [I48.91]  S/P carotid endarterectomy [Z98.890]  History of stroke [Z86.73]  Long-term (current) use of anticoagulants [Z79.01] [Z79.01]         Anticoagulation Episode Summary     INR check location     Preferred lab     Send INR reminders to  ANTICOAG POOL    Comments       Anticoagulation Care Providers     Provider Role Specialty Phone number    Esau Jimenez MD Referring Clinical Cardiac Electrophysiology [phone]    Darien Guillory MD [facility] [phone]            See the Encounter Report to view Anticoagulation Flowsheet and Dosing Calendar (Go to Encounters tab in chart review, and find the Anticoagulation Therapy Visit)        Gisela Vidal RN

## 2017-07-03 NOTE — MR AVS SNAPSHOT
Ricarda Bae   7/3/2017   Anticoagulation Therapy Visit    Description:  82 year old female   Provider:  Darien Guillory MD   Department:  [facility] Anti Coagulation           INR as of 7/3/2017     Today's INR No new INR was available at the time of this encounter.      Anticoagulation Summary as of 7/3/2017     INR goal 2.0-3.0   Today's INR No new INR was available at the time of this encounter.   Full instructions 7.5 mg on Mon, Wed, Fri; 5 mg all other days   Next INR check 7/7/2017    Indications   A-fib (H) [I48.91]  S/P carotid endarterectomy [Z98.890]  History of stroke [Z86.73]  Long-term (current) use of anticoagulants [Z79.01] [Z79.01]         July 2017 Details    Sun Mon Tue Wed Thu Fri Sat           1                 2               3      7.5 mg   See details      4      5 mg         5      7.5 mg         6      5 mg         7            8                 9               10               11               12               13               14               15                 16               17               18               19               20               21               22                 23               24               25               26               27               28               29                 30               31                     Date Details   07/03 This INR check       Date of next INR:  7/7/2017         How to take your warfarin dose     To take:  5 mg Take 1 of the 5 mg tablets.    To take:  7.5 mg Take 1.5 of the 5 mg tablets.

## 2017-07-24 ENCOUNTER — ANTICOAGULATION THERAPY VISIT (OUTPATIENT)
Dept: ANTICOAGULATION | Facility: OTHER | Age: 82
End: 2017-07-24

## 2017-07-24 DIAGNOSIS — Z98.890 S/P CAROTID ENDARTERECTOMY: ICD-10-CM

## 2017-07-24 DIAGNOSIS — Z79.01 LONG-TERM (CURRENT) USE OF ANTICOAGULANTS: ICD-10-CM

## 2017-07-24 DIAGNOSIS — Z86.73 HISTORY OF STROKE: ICD-10-CM

## 2017-07-24 NOTE — MR AVS SNAPSHOT
Ricarda Bae   7/24/2017   Anticoagulation Therapy Visit    Description:  82 year old female   Provider:  Darien Guillory MD   Department:  [facility] Anti Coagulation           INR as of 7/24/2017     Today's INR No new INR was available at the time of this encounter.      Anticoagulation Summary as of 7/24/2017     INR goal 2.0-3.0   Today's INR No new INR was available at the time of this encounter.   Full instructions 7.5 mg on Mon, Wed, Fri; 5 mg all other days   Next INR check 7/25/2017    Indications   A-fib (H) [I48.91]  S/P carotid endarterectomy [Z98.890]  History of stroke [Z86.73]  Long-term (current) use of anticoagulants [Z79.01] [Z79.01]         July 2017 Details    Sun Mon Tue Wed Thu Fri Sat           1                 2               3               4               5               6               7               8                 9               10               11               12               13               14               15                 16               17               18               19               20               21               22                 23               24      7.5 mg   See details      25            26               27               28               29                 30               31                     Date Details   07/24 This INR check       Date of next INR:  7/25/2017         How to take your warfarin dose     To take:  5 mg Take 1 of the 5 mg tablets.    To take:  7.5 mg Take 1.5 of the 5 mg tablets.

## 2017-07-24 NOTE — PROGRESS NOTES
ANTICOAGULATION FOLLOW-UP CLINIC VISIT    Patient Name:  Ricarda Bae  Date:  7/24/2017  Contact Type:  Telephone    SUBJECTIVE:     Patient Findings     Comments Call placed to .  He states that his wife is under hospice care now.  He was unable to tell me if she was still on warfarin.  Will call hospice tomorrow as homecare is no longer in office.            OBJECTIVE    INR   Date Value Ref Range Status   06/13/2017 1.93 (H) 0.80 - 1.20 Final       ASSESSMENT / PLAN  No question data found.  Anticoagulation Summary as of 7/24/2017     INR goal 2.0-3.0   Today's INR No new INR was available at the time of this encounter.   Maintenance plan 7.5 mg (5 mg x 1.5) on Mon, Wed, Fri; 5 mg (5 mg x 1) all other days   Full instructions 7.5 mg on Mon, Wed, Fri; 5 mg all other days   Weekly total 42.5 mg   No change documented Gisela Vidal RN   Plan last modified Gisela Bateman RN (5/19/2017)   Next INR check 7/25/2017   Target end date Indefinite    Indications   A-fib (H) [I48.91]  S/P carotid endarterectomy [Z98.890]  History of stroke [Z86.73]  Long-term (current) use of anticoagulants [Z79.01] [Z79.01]         Anticoagulation Episode Summary     INR check location     Preferred lab     Send INR reminders to [facility] ANTICOAG POOL    Comments       Anticoagulation Care Providers     Provider Role Specialty Phone number    Esau Jimenez MD Referring Clinical Cardiac Electrophysiology [phone]    Darien Guillory MD [facility] [phone]            See the Encounter Report to view Anticoagulation Flowsheet and Dosing Calendar (Go to Encounters tab in chart review, and find the Anticoagulation Therapy Visit)        Gisela Vidal, RN

## 2017-07-28 ENCOUNTER — ANTICOAGULATION THERAPY VISIT (OUTPATIENT)
Dept: ANTICOAGULATION | Facility: OTHER | Age: 82
End: 2017-07-28

## 2017-07-28 DIAGNOSIS — Z79.01 LONG-TERM (CURRENT) USE OF ANTICOAGULANTS: ICD-10-CM

## 2017-07-28 DIAGNOSIS — Z98.890 S/P CAROTID ENDARTERECTOMY: ICD-10-CM

## 2017-07-28 DIAGNOSIS — Z86.73 HISTORY OF STROKE: ICD-10-CM

## 2017-07-28 NOTE — MR AVS SNAPSHOT
Ricarda Bae   7/28/2017   Anticoagulation Therapy Visit    Description:  82 year old female   Provider:  Darien Guillory MD   Department:  Hc Anti Coagulation           INR as of 7/28/2017     Today's INR       Anticoagulation Summary as of 7/28/2017     INR goal 2.0-3.0   Today's INR    Full instructions 7.5 mg on Mon, Wed, Fri; 5 mg all other days   Next INR check     Indications   A-fib (H) [I48.91]  S/P carotid endarterectomy [Z98.890]  History of stroke [Z86.73]  Long-term (current) use of anticoagulants [Z79.01] [Z79.01]         Anticoagulation Episode Summary     Resolved date 7/28/2017    Resolved reason Therapy  Complete

## 2017-07-28 NOTE — PROGRESS NOTES
ANTICOAGULATION FOLLOW-UP CLINIC VISIT    Patient Name:  Ricarda Bae  Date:  7/28/2017  Contact Type:  Telephone/ spoke to one of the homecare nurses    SUBJECTIVE:     Patient Findings     Comments Had spoken to one of the nurses at homecare who states that patient must be off warfarin as it is no longer on her medication list. Patient is hospice care. Patient discharged from warfarin clinic           OBJECTIVE    INR   Date Value Ref Range Status   06/13/2017 1.93 (H) 0.80 - 1.20 Final       ASSESSMENT / PLAN  No question data found.  Anticoagulation Summary as of 7/28/2017     INR goal 2.0-3.0   Today's INR    Maintenance plan 7.5 mg (5 mg x 1.5) on Mon, Wed, Fri; 5 mg (5 mg x 1) all other days   Full instructions 7.5 mg on Mon, Wed, Fri; 5 mg all other days   Weekly total 42.5 mg   Plan last modified Gisela Bateman RN (5/19/2017)   Next INR check    Target end date Indefinite    Indications   A-fib (H) [I48.91]  S/P carotid endarterectomy [Z98.890]  History of stroke [Z86.73]  Long-term (current) use of anticoagulants [Z79.01] [Z79.01]         Anticoagulation Episode Summary     INR check location     Preferred lab     Resolved date 7/28/2017    Resolved reason Therapy  Complete    Send INR reminders to  ALAN POOL    Comments       Anticoagulation Care Providers     Provider Role Specialty Phone number    Esau Jimenez MD Referring Clinical Cardiac Electrophysiology [phone]    Darien Guillory MD [facility] [phone]            See the Encounter Report to view Anticoagulation Flowsheet and Dosing Calendar (Go to Encounters tab in chart review, and find the Anticoagulation Therapy Visit)        Gisela Vidal, RN

## 2017-09-06 NOTE — TELEPHONE ENCOUNTER
The only reason to have the apt or keep the apt is if they would like to turn the pacer off. Otherwise, they do not need to keep the apt and should cancel it. Thanks!    Dakotah Mccord

## 2017-09-06 NOTE — TELEPHONE ENCOUNTER
12:21 PM    Reason for Call: Phone Call    Description: Pt spouse would like call back. Pt has a Pacemaker appt scheduled for 10-10-17, but received a letter stating she is past due for a pacemaker check. The patient is currently in hospice care and is wondering if they should have an appointment at all.    Was an appointment offered for this call? Already has one  If yes : Appointment type Pacemaker              Date 10-10-17    Preferred method for responding to this message: Telephone Call  What is your phone number ? [phone]    If we cannot reach you directly, may we leave a detailed response at the number you provided? Yes    Can this message wait until your PCP/provider returns, if available today? Not applicable, provider is in today    Lisa Guillen

## 2017-09-07 NOTE — TELEPHONE ENCOUNTER
Outreach to patient, left message for patient to return call to the cardiology department.   Paulette Ramirez RN-BSN

## 2017-09-14 NOTE — MR AVS SNAPSHOT
"              After Visit Summary   2017    Ricarda Bae    MRN: [id number]           Patient Information     Date Of Birth          1935        Visit Information        Provider Department      2017 10:27 AM Emanuel Saez MD [facility]        Today's [facility]     Medicare Hospice Face to Face Recertification    -  1       Follow-ups after your visit        Who to contact     If you have questions or need follow up information about today's clinic visit or your schedule please contact [facility] directly at [phone].  Normal or non-critical lab and imaging results will be communicated to you by [employer], letter or phone within 4 business days after the clinic has received the results. If you do not hear from us within 7 days, please contact the clinic through [employer] or phone. If you have a critical or abnormal lab result, we will notify you by phone as soon as possible.  Submit refill requests through [employer]Guest or call your pharmacy and they will forward the refill request to us. Please allow 3 business days for your refill to be completed.          Additional Information About Your Visit        MyChart Information     [employer] lets you send messages to your doctor, view your test results, renew your prescriptions, schedule appointments and more. To sign up, go to [email or web address] . Click on \"Log in\" on the left side of the screen, which will take you to the Welcome page. Then click on \"Sign up Now\" on the right side of the page.     You will be asked to enter the access code listed below, as well as some personal information. Please follow the directions to create your username and password.     Your access code is: [email or web address]  Expires: 12/15/2017  4:12 PM     Your access code will  in 90 days. If you need help or a new code, please call your [facility] or [phone].        Care EveryWhere ID     This is your Care EveryWhere ID. This could be " used by other organizations to access your [place] medical records  [id number]         Blood Pressure from Last 3 Encounters:   06/13/17 119/58   11/27/16 162/100   11/08/16 138/68    Weight from Last 3 Encounters:   06/10/17 135 lb 2.3 oz (61.3 kg)   11/27/16 110 lb (49.9 kg)   11/08/16 140 lb 9.6 oz (63.8 kg)              Today, you had the following     No orders found for display         Today's Medication Changes          These changes are accurate as of: 9/14/17 11:59 PM.  If you have any questions, ask your nurse or doctor.               These medicines have changed or have updated prescriptions.        Dose/Directions    metoprolol 100 MG tablet   Commonly known as:  LOPRESSOR   This may have changed:  how much to take   Used for:  Hypertension        Dose:  100 mg   Take 1 tablet (100 mg) by mouth 2 times daily   Quantity:  60 tablet   Refills:  11                Primary Care Provider Office Phone # Fax #    Darien Guillory [phone]       [facility] [address] [address]        Equal Access to Services     [facility] : Hadii carrie sen hadinderjit Sovicky, waaxda luqadaha, qaybta kaaldereje barbosa, bry cortez. So [facility] [phone].    ATENCIÓN: Si habla español, tiene a vora disposición servicios gratuitos de asistencia lingüística. Galina al [phone].    We comply with applicable federal civil rights laws and [place] laws. We do not discriminate on the basis of race, color, national origin, age, disability sex, sexual orientation or gender identity.            Thank you!     Thank you for choosing [facility]  for your care. Our goal is always to provide you with excellent care. Hearing back from our patients is one way we can continue to improve our services. Please take a few minutes to complete the written survey that you may receive in the mail after your visit with us. Thank you!             Your Updated  Medication List - Protect others around you: Learn how to safely use, store and throw away your medicines at www.disposemymeds.org.          This list is accurate as of: 9/14/17 11:59 PM.  Always use your most recent med list.                   Brand Name Dispense Instructions for use Diagnosis    ASPIRIN PO      Take 81 mg by mouth daily        diphenoxylate-atropine 2.5-0.025 MG per tablet    LOMOTIL     TAKE 1 TABLET BY MOUTH two times DAILY AS NEEDED        FLORASTOR 250 MG capsule   Generic drug:  saccharomyces boulardii      TAKE 1 CAPSULE BY MOUTH AT BEDTIME        FUROSEMIDE PO      Take 40 mg by mouth daily        HYDROCHLOROTHIAZIDE PO      Take 25 mg by mouth daily        HYDROcodone-acetaminophen 5-325 MG per tablet    NORCO    15 tablet    Take 1 tablet by mouth every 6 hours as needed for moderate to severe pain    Intussusception of colon (H)       hypromellose 0.4 % Soln ophthalmic solution    ARTIFICIAL TEARS    1 Bottle    Place 1 drop into both eyes every hour as needed for dry eyes    Dry eyes, bilateral       magnesium hydroxide 400 MG/5ML suspension    MILK OF MAGNESIA     Take 5 mLs by mouth daily as needed for constipation or heartburn        MELATONIN PO      Take 10 mg by mouth At Bedtime        metoprolol 100 MG tablet    LOPRESSOR    60 tablet    Take 1 tablet (100 mg) by mouth 2 times daily    Hypertension       ondansetron 4 MG ODT tab    ZOFRAN-ODT     DISSOLVE 1 TABLET ON TONGUE EVERY 6 HOURS AS NEEDED FOR NAUSEA        psyllium 0.52 G capsule      Take 1 capsule by mouth daily        * warfarin 2.5 MG tablet    COUMADIN    30 tablet    Take as directed by [facility]    Permanent atrial fibrillation (H)       * warfarin 5 MG tablet    COUMADIN    30 tablet    Take 5 mg by mouth daily Take 7.5mg Mon, Wed, Fri; 5 mg all other days or as directed by warfarin clinic    Long-term (current) use of anticoagulants, S/P carotid endarterectomy, History of stroke       * warfarin 5 MG  tablet    COUMADIN    35 tablet    7.5mg Mon,Wed,Fri and 5mg all other days or as directed by warfarin clinic    Long term current use of anticoagulant therapy       WELLBUTRIN XL PO      Take 300 mg by mouth daily        * Notice:  This list has 3 medication(s) that are the same as other medications prescribed for you. Read the directions carefully, and ask your doctor or other care provider to review them with you.

## 2017-09-14 NOTE — PROGRESS NOTES
Physician Face to Face and Certification of Terminal Illness    Certification Start Date:  09/22/2017         Certification End Date:  11/20/2017      HISTORY OF PRESENT ILLNESS:  Ricarda is a 82 year old female, (1935),   is being seen today for a Medicare Hospice Face to Face Recertification visit. Patient is seen with hospice nurse and family present. Client suffers from end stage heart failure and is on maximal tolerated medical therapy..   Secondary diagnoses include chronic atrial fibrillation as well as hypertension .  Prognosis related comorbidities include anemia and depression.  Client is oxygen dependent and is dyspneic at rest and with minimal exertion.  There has been no recurrent chest pain.  Her appetite is diminished and she has lost weight since admission. Ricarda also has fatigue. She is on chronic anticoagulation with Coumadin.  . In addition, Ricarda has noted a fullness in her left abdomen and is concerned regarding a hernia.  Patient offers no other complaints.  Staff notes no other issues.    Current medications, allergies, and interdisciplinary plan of care is reviewed.             ROS:  Review of systems assessed and as stated below.    Denies nausea, vomiting, diarrhea, constipation, chest pain, urinary incontinence, edema, sleep disturbance, There is no pain noted.      PHYSICAL EXAMINATION:  There were no vitals taken for this visit.    GENERAL:  Chronically ill appearing.  HEENT:  Eyes grossly normal to inspection.  Hearing intact.  NECK:  No lymphadenopathy or thyromegaly.   PULM:  Lungs clear to auscultation. No rales  CV:  Irregular rhythm, without murmur.  No clicks or rubs appreciated.  ABDOMEN:  Soft, no masses or hepatosplenomegaly. There is thin abdominal wall noted on the left with mild distension.  There are hyperactive and high pitched bowel sounds.  SKIN:  Age related changes.  No suspicious lesions.   EXTREM:  No edema.  Pulses palpable.  PSYCH:  Alert and oriented. Affect  bright      Lab/Diagnostic data:    None    ASSESSMENT/PLAN:  Medicare Hospice Face to Face Recertification  Based on interview, examination, and review of pertinent clinical information, I certify patient qualifies for continued hospice care.    Attestation: I confirm that this narrative was composed by myself and is based on my review of the patient's medical record and results of the face to face encounter above.    Total time spent with patient visit was 1 hour.        Emanuel Saez MD

## 2017-09-16 PROBLEM — Z51.5 HOSPICE CARE: Status: ACTIVE | Noted: 2017-01-01

## 2017-11-09 NOTE — MR AVS SNAPSHOT
"              After Visit Summary   2017    Ricarda Bae    MRN: [id number]           Patient Information     Date Of Birth          1935        Visit Information        Provider Department      2017 11:00 AM Emanuel Saez MD [facility]        [facility]     Medicare Hospice Face to Face Recertification    -  1       Follow-ups after your visit        Who to contact     If you have questions or need follow up information about today's clinic visit or your schedule please contact [facility] directly at [phone].  Normal or non-critical lab and imaging results will be communicated to you by "[employer]hart, letter or phone within 4 business days after the clinic has received the results. If you do not hear from us within 7 days, please contact the clinic through "[employer]hart or phone. If you have a critical or abnormal lab result, we will notify you by phone as soon as possible.  Submit refill requests through [employer] or call your pharmacy and they will forward the refill request to us. Please allow 3 business days for your refill to be completed.          Additional Information About Your Visit        MyChart Information     [employer] lets you send messages to your doctor, view your test results, renew your prescriptions, schedule appointments and more. To sign up, go to [email or web address] . Click on \"Log in\" on the left side of the screen, which will take you to the Welcome page. Then click on \"Sign up Now\" on the right side of the page.     You will be asked to enter the access code listed below, as well as some personal information. Please follow the directions to create your username and password.     Your access code is: [email or web address]  Expires: 12/15/2017  3:12 PM     Your access code will  in 90 days. If you need help or a new code, please call your [facility] or [phone].        Care EveryWhere ID     This is your Care EveryWhere ID. This could be " used by other organizations to access your [place] medical records  [id number]         Blood Pressure from Last 3 Encounters:   06/13/17 119/58   11/27/16 162/100   11/08/16 138/68    Weight from Last 3 Encounters:   06/10/17 135 lb 2.3 oz (61.3 kg)   11/27/16 110 lb (49.9 kg)   11/08/16 140 lb 9.6 oz (63.8 kg)              Today, you had the following     No orders found for display         Today's Medication Changes          These changes are accurate as of: 11/9/17 11:59 PM.  If you have any questions, ask your nurse or doctor.               These medicines have changed or have updated prescriptions.        Dose/Directions    metoprolol 100 MG tablet   Commonly known as:  LOPRESSOR   This may have changed:  how much to take   Used for:  Hypertension        Dose:  100 mg   Take 1 tablet (100 mg) by mouth 2 times daily   Quantity:  60 tablet   Refills:  11                Primary Care Provider Office Phone # Fax #    Darien Guillory [phone]       [facility] [address] [address]        Equal Access to Services     [facility] : Hadii carrie sen hadinderjit Sovicky, waaxda luqbest, qaybta kaaldereje barbosa, bry cortez. So [facility] [phone].    ATENCIÓN: Si habla español, tiene a vora disposición servicios gratuitos de asistencia lingüística. Galina al [phone].    We comply with applicable federal civil rights laws and [place] laws. We do not discriminate on the basis of race, color, national origin, age, disability, sex, sexual orientation, or gender identity.            Thank you!     Thank you for choosing [facility]  for your care. Our goal is always to provide you with excellent care. Hearing back from our patients is one way we can continue to improve our services. Please take a few minutes to complete the written survey that you may receive in the mail after your visit with us. Thank you!             Your Updated  Medication List - Protect others around you: Learn how to safely use, store and throw away your medicines at www.disposemymeds.org.          This list is accurate as of: 11/9/17 11:59 PM.  Always use your most recent med list.                   Brand Name Dispense Instructions for use Diagnosis    ASPIRIN PO      Take 81 mg by mouth daily        diphenoxylate-atropine 2.5-0.025 MG per tablet    LOMOTIL     TAKE 1 TABLET BY MOUTH two times DAILY AS NEEDED        FLORASTOR 250 MG capsule   Generic drug:  saccharomyces boulardii      TAKE 1 CAPSULE BY MOUTH AT BEDTIME        FUROSEMIDE PO      Take 40 mg by mouth daily        HYDROCHLOROTHIAZIDE PO      Take 25 mg by mouth daily        HYDROcodone-acetaminophen 5-325 MG per tablet    NORCO    15 tablet    Take 1 tablet by mouth every 6 hours as needed for moderate to severe pain    Intussusception of colon (H)       hypromellose 0.4 % Soln ophthalmic solution    ARTIFICIAL TEARS    1 Bottle    Place 1 drop into both eyes every hour as needed for dry eyes    Dry eyes, bilateral       magnesium hydroxide 400 MG/5ML suspension    MILK OF MAGNESIA     Take 5 mLs by mouth daily as needed for constipation or heartburn        MELATONIN PO      Take 10 mg by mouth At Bedtime        metoprolol 100 MG tablet    LOPRESSOR    60 tablet    Take 1 tablet (100 mg) by mouth 2 times daily    Hypertension       ondansetron 4 MG ODT tab    ZOFRAN-ODT     DISSOLVE 1 TABLET ON TONGUE EVERY 6 HOURS AS NEEDED FOR NAUSEA        psyllium 0.52 G capsule      Take 1 capsule by mouth daily        * warfarin 2.5 MG tablet    COUMADIN    30 tablet    Take as directed by [facility]    Permanent atrial fibrillation (H)       * warfarin 5 MG tablet    COUMADIN    30 tablet    Take 5 mg by mouth daily Take 7.5mg Mon, Wed, Fri; 5 mg all other days or as directed by warfarin clinic    Long-term (current) use of anticoagulants, S/P carotid endarterectomy, History of stroke       * warfarin 5 MG  tablet    COUMADIN    35 tablet    7.5mg Mon,Wed,Fri and 5mg all other days or as directed by warfarin clinic    Long term current use of anticoagulant therapy       WELLBUTRIN XL PO      Take 300 mg by mouth daily        * Notice:  This list has 3 medication(s) that are the same as other medications prescribed for you. Read the directions carefully, and ask your doctor or other care provider to review them with you.

## 2017-11-12 NOTE — PROGRESS NOTES
Physician Face to Face and Certification of Terminal Illness    Certification Start Date:  11/21/2017         Certification End Date:  01/19/2018      HISTORY OF PRESENT ILLNESS:  Ricarda is a 82 year old female, (1935),   is being seen today for a Medicare Hospice Face to Face Recertification visit. Patient is seen with hospice nurse and family present. Client suffers from end stage heart failure and is on maximal tolerated medical therapy..   Secondary diagnoses include chronic atrial fibrillation as well as hypertension .  Prognosis related comorbidities include anemia and depression.  Client is oxygen dependent and is dyspneic at rest and with minimal exertion.  There has been no recurrent chest pain.  Her appetite is diminished and she has lost weight since admission. Ricarda also has fatigue. She is on chronic anticoagulation with Coumadin.  Patient offers no other complaints.  Staff notes no other issues.    Current medications, allergies, and interdisciplinary plan of care is reviewed.             ROS:  Review of systems assessed and as stated below.    Denies nausea, vomiting, diarrhea, constipation, chest pain, urinary incontinence, edema, sleep disturbance, There is no pain noted.      PHYSICAL EXAMINATION:  There were no vitals taken for this visit.    GENERAL:  Chronically ill appearing.  HEENT:  Eyes grossly normal to inspection.  Hearing intact.  NECK:  No lymphadenopathy or thyromegaly.   PULM:  Lungs clear to auscultation. No rales  CV:  Irregular rhythm, without murmur.  No clicks or rubs appreciated.  ABDOMEN:  Soft, no masses or hepatosplenomegaly. There is thin abdominal wall noted on the left with mild distension.  There are hyperactive and high pitched bowel sounds.  SKIN:  Age related changes.  No suspicious lesions.   EXTREM:  No edema.  Pulses palpable.  PSYCH:  Alert and oriented. Affect bright      Lab/Diagnostic data:    None    ASSESSMENT/PLAN:  Medicare Hospice Face to Face  Recertification  Based on interview, examination, and review of pertinent clinical information, I certify patient qualifies for continued hospice care.    Attestation: I confirm that this narrative was composed by myself and is based on my review of the patient's medical record and results of the face to face encounter above.    Total time spent with patient visit was 1 hour.        Emanuel Saez MD

## 2018-01-01 ENCOUNTER — TELEPHONE (OUTPATIENT)
Dept: FAMILY MEDICINE | Facility: OTHER | Age: 83
End: 2018-01-01

## 2018-01-01 ENCOUNTER — OFFICE VISIT (OUTPATIENT)
Dept: FAMILY MEDICINE | Facility: OTHER | Age: 83
End: 2018-01-01
Attending: FAMILY MEDICINE
Payer: MEDICARE

## 2018-01-01 ENCOUNTER — DOCUMENTATION ONLY (OUTPATIENT)
Dept: FAMILY MEDICINE | Facility: OTHER | Age: 83
End: 2018-01-01

## 2018-01-01 ENCOUNTER — NURSING HOME VISIT (OUTPATIENT)
Dept: FAMILY MEDICINE | Facility: OTHER | Age: 83
End: 2018-01-01
Payer: COMMERCIAL

## 2018-01-01 ENCOUNTER — OFFICE VISIT (OUTPATIENT)
Dept: FAMILY MEDICINE | Facility: OTHER | Age: 83
End: 2018-01-01
Attending: FAMILY MEDICINE
Payer: COMMERCIAL

## 2018-01-01 ENCOUNTER — OFFICE VISIT (OUTPATIENT)
Dept: FAMILY MEDICINE | Facility: OTHER | Age: 83
End: 2018-01-01
Attending: NURSE PRACTITIONER
Payer: COMMERCIAL

## 2018-01-01 DIAGNOSIS — I48.20 CHRONIC ATRIAL FIBRILLATION (H): Primary | ICD-10-CM

## 2018-01-01 DIAGNOSIS — Z51.5 HOSPICE CARE PATIENT: ICD-10-CM

## 2018-01-01 DIAGNOSIS — I48.20 CHRONIC ATRIAL FIBRILLATION (H): ICD-10-CM

## 2018-01-01 DIAGNOSIS — D50.8 OTHER IRON DEFICIENCY ANEMIA: ICD-10-CM

## 2018-01-01 DIAGNOSIS — Z51.5 HOSPICE CARE: Primary | ICD-10-CM

## 2018-01-01 DIAGNOSIS — I50.82 ACUTE ON CHRONIC CONGESTIVE HEART FAILURE WITH RIGHT VENTRICULAR DIASTOLIC DYSFUNCTION (H): ICD-10-CM

## 2018-01-01 DIAGNOSIS — I50.82 ACUTE ON CHRONIC CONGESTIVE HEART FAILURE WITH RIGHT VENTRICULAR DIASTOLIC DYSFUNCTION (H): Primary | ICD-10-CM

## 2018-01-01 DIAGNOSIS — I50.9 CONGESTIVE HEART FAILURE, UNSPECIFIED CONGESTIVE HEART FAILURE CHRONICITY, UNSPECIFIED CONGESTIVE HEART FAILURE TYPE: ICD-10-CM

## 2018-01-01 DIAGNOSIS — I50.33 ACUTE ON CHRONIC CONGESTIVE HEART FAILURE WITH RIGHT VENTRICULAR DIASTOLIC DYSFUNCTION (H): ICD-10-CM

## 2018-01-01 DIAGNOSIS — I50.33 ACUTE ON CHRONIC CONGESTIVE HEART FAILURE WITH RIGHT VENTRICULAR DIASTOLIC DYSFUNCTION (H): Primary | ICD-10-CM

## 2018-01-01 DIAGNOSIS — Z79.01 LONG-TERM (CURRENT) USE OF ANTICOAGULANTS: ICD-10-CM

## 2018-01-01 DIAGNOSIS — C80.1 SIGNET RING CELL ADENOCARCINOMA (H): ICD-10-CM

## 2018-01-01 DIAGNOSIS — I10 ESSENTIAL HYPERTENSION: ICD-10-CM

## 2018-01-01 DIAGNOSIS — Z53.9 ERRONEOUS ENCOUNTER--DISREGARD: Primary | ICD-10-CM

## 2018-01-01 DIAGNOSIS — I67.9 CVD (CEREBROVASCULAR DISEASE): ICD-10-CM

## 2018-01-01 DIAGNOSIS — Z95.0 CARDIAC PACEMAKER IN SITU: ICD-10-CM

## 2018-01-01 PROCEDURE — 99207 ZZC NO CHARGE LOS: CPT | Mod: GV | Performed by: FAMILY MEDICINE

## 2018-01-01 PROCEDURE — 99207 ZZC NO CHARGE LOS: CPT | Mod: GV | Performed by: NURSE PRACTITIONER

## 2018-01-01 PROCEDURE — 99308 SBSQ NF CARE LOW MDM 20: CPT | Mod: GV | Performed by: FAMILY MEDICINE

## 2018-01-01 RX ORDER — OXYCODONE HYDROCHLORIDE 5 MG/1
5 TABLET ORAL EVERY 4 HOURS PRN
COMMUNITY

## 2018-01-01 RX ORDER — MORPHINE SULFATE 20 MG/ML
5 SOLUTION ORAL EVERY 4 HOURS PRN
COMMUNITY

## 2018-01-17 NOTE — MR AVS SNAPSHOT
"              After Visit Summary   2018    Ricarda Bae    MRN: [id number]           Patient Information     Date Of Birth          1935        Visit Information        Provider Department      2018 1:00 PM [facility]        [facility]     Medicare Hospice Face to Face Recertification    -  1       Follow-ups after your visit        Who to contact     If you have questions or need follow up information about today's clinic visit or your schedule please contact [facility] directly at [phone].  Normal or non-critical lab and imaging results will be communicated to you by Engradehart, letter or phone within 4 business days after the clinic has received the results. If you do not hear from us within 7 days, please contact the clinic through Engradehart or phone. If you have a critical or abnormal lab result, we will notify you by phone as soon as possible.  Submit refill requests through MunchAway or call your pharmacy and they will forward the refill request to us. Please allow 3 business days for your refill to be completed.          Additional Information About Your Visit        MyChart Information     MunchAway lets you send messages to your doctor, view your test results, renew your prescriptions, schedule appointments and more. To sign up, go to [email or web address] . Click on \"Log in\" on the left side of the screen, which will take you to the Welcome page. Then click on \"Sign up Now\" on the right side of the page.     You will be asked to enter the access code listed below, as well as some personal information. Please follow the directions to create your username and password.     Your access code is: [email or web address]  Expires: 2018  5:25 AM     Your access code will  in 90 days. If you need help or a new code, please call your [place] clinic or [phone].        Care EveryWhere ID     This is your Care EveryWhere ID. This could be " used by other organizations to access your [place] medical records  [id number]         Blood Pressure from Last 3 Encounters:   06/13/17 119/58   11/27/16 162/100   11/08/16 138/68    Weight from Last 3 Encounters:   06/10/17 135 lb 2.3 oz (61.3 kg)   11/27/16 110 lb (49.9 kg)   11/08/16 140 lb 9.6 oz (63.8 kg)              Today, you had the following     No orders found for display         Today's Medication Changes          These changes are accurate as of 1/17/18 11:59 PM.  If you have any questions, ask your nurse or doctor.               These medicines have changed or have updated prescriptions.        Dose/Directions    metoprolol tartrate 100 MG tablet   Commonly known as:  LOPRESSOR   This may have changed:  how much to take   Used for:  Hypertension        Dose:  100 mg   Take 1 tablet (100 mg) by mouth 2 times daily   Quantity:  60 tablet   Refills:  11                Primary Care Provider Office Phone # Fax #    Darien Guillory [phone]       [facility] [address] [address]        Equal Access to Services     [facility]: Hadii carrie haley Sovicky, waaxda lusaskia, qaybta nichole barbosa, bry cortez. So [facility] [phone].    ATENCIÓN: Si habla español, tiene a vora disposición servicios gratuitos de asistencia lingüística. [facility] [phone].    We comply with applicable federal civil rights laws and [place] laws. We do not discriminate on the basis of race, color, national origin, age, disability, sex, sexual orientation, or gender identity.            Thank you!     Thank you for choosing [facility]  for your care. Our goal is always to provide you with excellent care. Hearing back from our patients is one way we can continue to improve our services. Please take a few minutes to complete the written survey that you may receive in the mail after your visit with us. Thank you!             Your  Updated Medication List - Protect others around you: Learn how to safely use, store and throw away your medicines at www.disposemymeds.org.          This list is accurate as of 1/17/18 11:59 PM.  Always use your most recent med list.                   Brand Name Dispense Instructions for use Diagnosis    ASPIRIN PO      Take 81 mg by mouth daily        diphenoxylate-atropine 2.5-0.025 MG per tablet    LOMOTIL     TAKE 1 TABLET BY MOUTH two times DAILY AS NEEDED        FLORASTOR 250 MG capsule   Generic drug:  saccharomyces boulardii      TAKE 1 CAPSULE BY MOUTH AT BEDTIME        FUROSEMIDE PO      Take 40 mg by mouth daily        HYDROCHLOROTHIAZIDE PO      Take 25 mg by mouth daily        HYDROcodone-acetaminophen 5-325 MG per tablet    NORCO    15 tablet    Take 1 tablet by mouth every 6 hours as needed for moderate to severe pain    Intussusception of colon (H)       hypromellose 0.4 % Soln ophthalmic solution    ARTIFICIAL TEARS    1 Bottle    Place 1 drop into both eyes every hour as needed for dry eyes    Dry eyes, bilateral       magnesium hydroxide 400 MG/5ML suspension    MILK OF MAGNESIA     Take 5 mLs by mouth daily as needed for constipation or heartburn        MELATONIN PO      Take 10 mg by mouth At Bedtime        metoprolol tartrate 100 MG tablet    LOPRESSOR    60 tablet    Take 1 tablet (100 mg) by mouth 2 times daily    Hypertension       ondansetron 4 MG ODT tab    ZOFRAN-ODT     DISSOLVE 1 TABLET ON TONGUE EVERY 6 HOURS AS NEEDED FOR NAUSEA        psyllium 0.52 G capsule      Take 1 capsule by mouth daily        * warfarin 2.5 MG tablet    COUMADIN    30 tablet    Take as directed by [facility]    Permanent atrial fibrillation (H)       * warfarin 5 MG tablet    COUMADIN    30 tablet    Take 5 mg by mouth daily Take 7.5mg Mon, Wed, Fri; 5 mg all other days or as directed by warfarin clinic    Long-term (current) use of anticoagulants, S/P carotid endarterectomy, History of stroke       *  warfarin 5 MG tablet    COUMADIN    35 tablet    7.5mg Mon,Wed,Fri and 5mg all other days or as directed by warfarin clinic    Long term current use of anticoagulant therapy       WELLBUTRIN XL PO      Take 300 mg by mouth daily        * Notice:  This list has 3 medication(s) that are the same as other medications prescribed for you. Read the directions carefully, and ask your doctor or other care provider to review them with you.

## 2018-02-07 NOTE — PROGRESS NOTES
Physician Face to Face and Certification of Terminal Illness    Certification Start Date:  01/20/2018         Certification End Date:  03/20/2018      HISTORY OF PRESENT ILLNESS:  Ricarda is a 82 year old female, (1935),   is being seen today for a Medicare Hospice Face to Face Recertification visit. Patient is seen with hospice nurse and family present. Client suffers from end stage heart failure and is on maximal tolerated medical therapy..   Secondary diagnoses include chronic atrial fibrillation as well as hypertension .  Prognosis related comorbidities include anemia and depression.  Client is oxygen dependent and is dyspneic at rest and with minimal exertion.  There has been no recurrent chest pain.  Her appetite is diminished and she has lost weight since admission. Ricarda also has fatigue. She is on chronic anticoagulation with Coumadin.  Patient offers no other complaints.  Staff notes no other issues.    Current medications, allergies, and interdisciplinary plan of care is reviewed.             ROS:  Review of systems assessed and as stated below.    Denies nausea, vomiting, diarrhea, constipation, chest pain, urinary incontinence, edema, sleep disturbance, There is no pain noted.      PHYSICAL EXAMINATION:  There were no vitals taken for this visit.    GENERAL:  Chronically ill appearing.  HEENT:  Eyes grossly normal to inspection.  Hearing intact.  NECK:  No lymphadenopathy or thyromegaly.   PULM:  Lungs clear to auscultation. No rales  CV:  Irregular rhythm, without murmur.  No clicks or rubs appreciated.  ABDOMEN:  Soft, no masses or hepatosplenomegaly. There is thin abdominal wall noted on the left with mild distension.  There are hyperactive and high pitched bowel sounds.  SKIN:  Age related changes.  No suspicious lesions.   EXTREM:  No edema.  Pulses palpable.  PSYCH:  Alert and oriented. Affect bright      Lab/Diagnostic data:    None    ASSESSMENT/PLAN:  Medicare Hospice Face to Face  Recertification  Based on interview, examination, and review of pertinent clinical information, I certify patient qualifies for continued hospice care.    Attestation: I confirm that this narrative was composed by myself and is based on my review of the patient's medical record and results of the face to face encounter above.    Total time spent with patient visit was 1 hour.        Emanuel Saez MD

## 2018-03-12 NOTE — MR AVS SNAPSHOT
"              After Visit Summary   3/12/2018    Ricarda Bae    MRN: [id number]           Patient Information     Date Of Birth          1935        Visit Information        Provider Department      3/12/2018 2:20 PM Emanuel Saez MD [facility]        Today's [facility]     Medicare Hospice Face to Face Recertification    -  1       Follow-ups after your visit        Who to contact     If you have questions or need follow up information about today's clinic visit or your schedule please contact [facility] directly at [phone].  Normal or non-critical lab and imaging results will be communicated to you by SC[employer] Diamond Corporationhart, letter or phone within 4 business days after the clinic has received the results. If you do not hear from us within 7 days, please contact the clinic through [employer]hart or phone. If you have a critical or abnormal lab result, we will notify you by phone as soon as possible.  Submit refill requests through InvitedHome or call your pharmacy and they will forward the refill request to us. Please allow 3 business days for your refill to be completed.          Additional Information About Your Visit        MyChart Information     InvitedHome lets you send messages to your doctor, view your test results, renew your prescriptions, schedule appointments and more. To sign up, go to [email or web address] . Click on \"Log in\" on the left side of the screen, which will take you to the Welcome page. Then click on \"Sign up Now\" on the right side of the page.     You will be asked to enter the access code listed below, as well as some personal information. Please follow the directions to create your username and password.     Your access code is: [email or web address]  Expires: 2018  6:25 AM     Your access code will  in 90 days. If you need help or a new code, please call your [place] clinic or [phone].        Care EveryWhere ID     This is your Care EveryWhere ID. This could be " used by other organizations to access your [place] medical records  [id number]         Blood Pressure from Last 3 Encounters:   06/13/17 119/58   11/27/16 162/100   11/08/16 138/68    Weight from Last 3 Encounters:   06/10/17 135 lb 2.3 oz (61.3 kg)   11/27/16 110 lb (49.9 kg)   11/08/16 140 lb 9.6 oz (63.8 kg)              Today, you had the following     No orders found for display         Today's Medication Changes          These changes are accurate as of 3/12/18 11:59 PM.  If you have any questions, ask your nurse or doctor.               These medicines have changed or have updated prescriptions.        Dose/Directions    metoprolol tartrate 100 MG tablet   Commonly known as:  LOPRESSOR   This may have changed:  how much to take   Used for:  Hypertension        Dose:  100 mg   Take 1 tablet (100 mg) by mouth 2 times daily   Quantity:  60 tablet   Refills:  11                Primary Care Provider Office Phone # Fax #    Darien Guillory [phone]       [facility] [address] [address]        Equal Access to Services     [facility]: Hadii carrie haley Sovicky, waaxda lusaskia, qaybta nichole barbosa, bry cortez. So [facility] [phone].    ATENCIÓN: Si habla español, tiene a vora disposición servicios gratuitos de asistencia lingüística. [facility] [phone].    We comply with applicable federal civil rights laws and [place] laws. We do not discriminate on the basis of race, color, national origin, age, disability, sex, sexual orientation, or gender identity.            Thank you!     Thank you for choosing [facility]  for your care. Our goal is always to provide you with excellent care. Hearing back from our patients is one way we can continue to improve our services. Please take a few minutes to complete the written survey that you may receive in the mail after your visit with us. Thank you!             Your  Updated Medication List - Protect others around you: Learn how to safely use, store and throw away your medicines at www.disposemymeds.org.          This list is accurate as of 3/12/18 11:59 PM.  Always use your most recent med list.                   Brand Name Dispense Instructions for use Diagnosis    ASPIRIN PO      Take 81 mg by mouth daily        diphenoxylate-atropine 2.5-0.025 MG per tablet    LOMOTIL     TAKE 1 TABLET BY MOUTH two times DAILY AS NEEDED        FLORASTOR 250 MG capsule   Generic drug:  saccharomyces boulardii      TAKE 1 CAPSULE BY MOUTH AT BEDTIME        FUROSEMIDE PO      Take 40 mg by mouth daily        HYDROCHLOROTHIAZIDE PO      Take 25 mg by mouth daily        HYDROcodone-acetaminophen 5-325 MG per tablet    NORCO    15 tablet    Take 1 tablet by mouth every 6 hours as needed for moderate to severe pain    Intussusception of colon (H)       hypromellose 0.4 % Soln ophthalmic solution    ARTIFICIAL TEARS    1 Bottle    Place 1 drop into both eyes every hour as needed for dry eyes    Dry eyes, bilateral       magnesium hydroxide 400 MG/5ML suspension    MILK OF MAGNESIA     Take 5 mLs by mouth daily as needed for constipation or heartburn        MELATONIN PO      Take 10 mg by mouth At Bedtime        metoprolol tartrate 100 MG tablet    LOPRESSOR    60 tablet    Take 1 tablet (100 mg) by mouth 2 times daily    Hypertension       ondansetron 4 MG ODT tab    ZOFRAN-ODT     DISSOLVE 1 TABLET ON TONGUE EVERY 6 HOURS AS NEEDED FOR NAUSEA        oxyCODONE IR 5 MG tablet    ROXICODONE     Take 5 mg by mouth every 4 hours as needed Indications: Pain        psyllium 0.52 G capsule      Take 1 capsule by mouth daily        * warfarin 2.5 MG tablet    COUMADIN    30 tablet    Take as directed by [facility]    Permanent atrial fibrillation (H)       * warfarin 5 MG tablet    COUMADIN    30 tablet    Take 5 mg by mouth daily Take 7.5mg Mon, Wed, Fri; 5 mg all other days or as directed by  warfarin clinic    Long-term (current) use of anticoagulants, S/P carotid endarterectomy, History of stroke       * warfarin 5 MG tablet    COUMADIN    35 tablet    7.5mg Mon,Wed,Fri and 5mg all other days or as directed by warfarin clinic    Long term current use of anticoagulant therapy       WELLBUTRIN XL PO      Take 300 mg by mouth daily        * Notice:  This list has 3 medication(s) that are the same as other medications prescribed for you. Read the directions carefully, and ask your doctor or other care provider to review them with you.

## 2018-03-13 NOTE — PROGRESS NOTES
Physician Face to Face and Certification of Terminal Illness    Certification Start Date:  03/21/2018         Certification End Date:  05/19/2018      HISTORY OF PRESENT ILLNESS:  Ricarda is a 82 year old female, (1935),   is being seen today for a Medicare Hospice Face to Face Recertification visit. Patient is seen with hospice nurse and family present. Client suffers from end stage heart failure and is on maximal tolerated medical therapy..   Secondary diagnoses include chronic atrial fibrillation as well as hypertension .  Prognosis related comorbidities include anemia and depression.  Client is oxygen dependent and is dyspneic at rest and with minimal exertion.  There has been no recurrent chest pain.  Her appetite is diminished and she continues to lose weight. Ricarda also has fatigue. She is on chronic anticoagulation with Coumadin.  Her depression has worsened and she has been on maximum dose buproprion SR Patient offers no other complaints.  Staff notes no other issues.    Current medications, allergies, and interdisciplinary plan of care is reviewed.             ROS:  Review of systems assessed and as stated below.    Denies nausea, vomiting, diarrhea, constipation, chest pain, urinary incontinence, edema, sleep disturbance, There is no pain noted.      PHYSICAL EXAMINATION:  There were no vitals taken for this visit.    GENERAL:  Chronically ill appearing.  HEENT:  Eyes grossly normal to inspection.  Hearing intact.  NECK:  No lymphadenopathy or thyromegaly.   PULM:  Lungs clear to auscultation. No rales  CV:  Irregular rhythm, without murmur.  No clicks or rubs appreciated.  ABDOMEN:  Soft, no masses or hepatosplenomegaly. There is thin abdominal wall noted on the left with mild distension.  There are hyperactive and high pitched bowel sounds.  SKIN:  Age related changes.  No suspicious lesions.   EXTREM:  No edema.  Pulses palpable.  PSYCH:  Alert and oriented. Affect bright      Lab/Diagnostic data:     None    ASSESSMENT/PLAN:  Medicare Hospice Face to Face Recertification  Will recommend stopping the buproprion SR and begin sertraline (Zoloft).    Based on interview, examination, and review of pertinent clinical information, I certify patient qualifies for continued hospice care.    Attestation: I confirm that this narrative was composed by myself and is based on my review of the patient's medical record and results of the face to face encounter above.    Total time spent with patient visit was 1 hour.        Emanuel Saez MD

## 2018-05-15 NOTE — PROGRESS NOTES
"Physician Face to Face and Certification of Terminal Illness    Certification Start Date:  05/20/18  Certification End Date:  07/18/18      HISTORY OF PRESENT ILLNESS:  Ricarda is a 83 year old female, (1935),   is being seen today for a Medicare Hospice Face to Face Recertification visit.  She is seen at home because it takes a taxing effort to leave home. Patient is seen with family present. Client suffers from end stage heart failure and is on maximal tolerated medical therapy.   Secondary diagnoses include chronic atrial fibrillation and hypertension.  Prognosis related comorbidities include anemia and depression.    Current symptoms include shortness of breath with exertion.  She wears her oxygen most of the day.    The patient feels that these are managed well.  She is not using any oral morphine.  She occ takes a hydrocodone/acetominophen rarely for her shoulder pain.  Her visiting hospice nurses report that Ricarda has been increasingly tired and sleeping during the day, she has lost some of her interest in daily activities and her lips will turn blue after walking from the bedroom to the living room with her oxygen on.  Her memory is also noticeably worse.  She used to enjoy getting out to her cabin in the warm months, but now finds it easier to \"just stay home\".  Her  is present for the visit.    Current medications, allergies, and interdisciplinary plan of care is reviewed.    ROS:  Review Of Systems  Skin: negative  Eyes: glasses  Ears/Nose/Throat: negative  Respiratory: Shortness of breath- with exertion, on oxygen  Cardiovascular: has atrial fib and a pacemaker  Gastrointestinal: diarrhea  Genitourinary: negative  Musculoskeletal: arthritis, joint pain and shoulder and neck pain  Neurologic: negative  Psychiatric: negative  Hematologic/Lymphatic/Immunologic: negative  Endocrine: negative     karnovsky scale- 60%   palliative performance scale- 60%    PHYSICAL EXAMINATION:  There were no vitals " "taken for this visit.    GENERAL:  Chronically ill appearing.  HEENT:  Eyes grossly normal to inspection.  Hearing intact.  NECK:  No lymphadenopathy or thyromegaly.   PULM:  Breath sounds reduced in his bilateral lungs, oxygen on continuous.  CV:  RRR, with a murmur noted.  No clicks or rubs appreciated.  ABDOMEN:  Soft, no masses or hepatosplenomegaly.  Good bowel sounds.  SKIN:  Age related changes.  No suspicious lesions.   EXTREM:  No edema.  Pulses good.  PSYCH:  Alert and oriented. Affect is normal.    Lab/Diagnostic data:    N/A    ASSESSMENT/PLAN:  (Z51.5) Medicare Hospice Face to Face Recertification  (primary encounter diagnosis)  Comment: Patient continues to slowly deteriorate  Plan: Continue hospice cares    (I50.82,  I50.33) Acute on chronic congestive heart failure with right ventricular diastolic dysfunction (H)  Comment: Patient continues to slowly deteriorate.    Plan: Continue current medication regimen and oxygen.    (I48.2) Chronic atrial fibrillation (H)  Comment: Patient has no symptoms related to her atrial fib  Plan: Continue on coumadin    Based on interview, examination, and review of pertinent clinical information, I certify patient qualifies for continued hospice care.    Attestation: I confirm that this narrative was composed by myself and is based on my review of the patient's medical record and results of the face to face encounter above.    Total time spent with patient visit was 50\".    Evelyne Romero CNP, [facility]  [facility] and [facility]          "

## 2018-05-15 NOTE — MR AVS SNAPSHOT
"              After Visit Summary   5/15/2018    Ricarda Bae    MRN: [id number]           Patient Information     Date Of Birth          1935        Visit Information        Provider Department      5/15/2018 1:00 PM [facility]ero NP [facility]        Today's Diagnoses     Medicare Hospice Face to Face Recertification    -  1    Acute on chronic congestive heart failure with right ventricular diastolic dysfunction (H)        Chronic atrial fibrillation (H)           Follow-ups after your visit        Who to contact     If you have questions or need follow up information about today's clinic visit or your schedule please contact [facility] directly at [phone].  Normal or non-critical lab and imaging results will be communicated to you by MyChart, letter or phone within 4 business days after the clinic has received the results. If you do not hear from us within 7 days, please contact the clinic through [employer] or phone. If you have a critical or abnormal lab result, we will notify you by phone as soon as possible.  Submit refill requests through [employer] or call your pharmacy and they will forward the refill request to us. Please allow 3 business days for your refill to be completed.          Additional Information About Your Visit        MyChart Information     [employer] lets you send messages to your doctor, view your test results, renew your prescriptions, schedule appointments and more. To sign up, go to [email or web address] . Click on \"Log in\" on the left side of the screen, which will take you to the Welcome page. Then click on \"Sign up Now\" on the right side of the page.     You will be asked to enter the access code listed below, as well as some personal information. Please follow the directions to create your username and password.     Your access code is: [email or web address]  Expires: 8/15/2018  8:21 AM     Your access code will  in 90 days. If you need help or a new " code, please call your [place] clinic or [phone].        Care EveryWhere ID     This is your Care EveryWhere ID. This could be used by other organizations to access your [place] medical records  [id number]         Blood Pressure from Last 3 Encounters:   06/13/17 119/58   11/27/16 162/100   11/08/16 138/68    Weight from Last 3 Encounters:   06/10/17 135 lb 2.3 oz (61.3 kg)   11/27/16 110 lb (49.9 kg)   11/08/16 140 lb 9.6 oz (63.8 kg)              Today, you had the following     No orders found for display         Today's Medication Changes          These changes are accurate as of 5/15/18 11:59 PM.  If you have any questions, ask your nurse or doctor.               These medicines have changed or have updated prescriptions.        Dose/Directions    metoprolol tartrate 100 MG tablet   Commonly known as:  LOPRESSOR   This may have changed:  how much to take   Used for:  Hypertension        Dose:  100 mg   Take 1 tablet (100 mg) by mouth 2 times daily   Quantity:  60 tablet   Refills:  11               Information about OPIOIDS     PRESCRIPTION OPIOIDS: WHAT YOU NEED TO KNOW   You have a prescription for an opioid (narcotic) pain medicine. Opioids can cause addiction. If you have a history of chemical dependency of any type, you are at a higher risk of becoming addicted to opioids. Only take this medicine after all other options have been tried. Take it for as short a time and as few doses as possible.     Do not:    Drive. If you drive while taking these medicines, you could be arrested for driving under the influence (DUI).    Operate heavy machinery    Do any other dangerous activities while taking these medicines.     Drink any alcohol while taking these medicines.      Take with any other medicines that contain acetaminophen. Read all labels carefully. Look for the word  acetaminophen  or  Tylenol.  Ask your pharmacist if you have questions or are unsure.    Store your pills in a secure place, locked  if possible. We will not replace any lost or stolen medicine. If you don t finish your medicine, please throw away (dispose) as directed by your pharmacist. The [place] [employer]ution [employer] has more information about safe disposal: https://www.pca.[facility].[place].us/living-green/managing-unwanted-medications    All opioids tend to cause constipation. Drink plenty of water and eat foods that have a lot of fiber, such as fruits, vegetables, prune juice, apple juice and high-fiber cereal. Take a laxative (Miralax, milk of magnesia, Colace, Senna) if you don t move your bowels at least every other day.          Primary Care Provider Office Phone # Fax #    Darien Guillory [phone]       [facility] [address] [facility] [address]        Equal Access to Services     EFRA OKEEFE : Hadjess Villa, waaxda dontaeqbest, qaybta kaalmada chelsey, bry tejada . So [facility] [phone].    ATENCIÓN: Si habla español, tiene a vora disposición servicios gratuitos de asistencia lingüística. Galina al [phone].    We comply with applicable federal civil rights laws and [place] laws. We do not discriminate on the basis of race, color, national origin, age, disability, sex, sexual orientation, or gender identity.            Thank you!     Thank you for choosing [facility]  for your care. Our goal is always to provide you with excellent care. Hearing back from our patients is one way we can continue to improve our services. Please take a few minutes to complete the written survey that you may receive in the mail after your visit with us. Thank you!             Your Updated Medication List - Protect others around you: Learn how to safely use, store and throw away your medicines at www.disposemymeds.org.          This list is accurate as of 5/15/18 11:59 PM.  Always use your most recent med list.                   Brand Name Dispense Instructions  for use Diagnosis    ASPIRIN PO      Take 325 mg by mouth daily        diphenoxylate-atropine 2.5-0.025 MG per tablet    LOMOTIL     TAKE 1 TABLET BY MOUTH two times DAILY AS NEEDED        FLORASTOR 250 MG capsule   Generic drug:  saccharomyces boulardii      TAKE 1 CAPSULE BY MOUTH AT BEDTIME        FUROSEMIDE PO      Take 40 mg by mouth daily        HYDROCHLOROTHIAZIDE PO      Take 25 mg by mouth daily        HYDROcodone-acetaminophen 5-325 MG per tablet    NORCO    15 tablet    Take 1 tablet by mouth every 6 hours as needed for moderate to severe pain    Intussusception of colon (H)       hypromellose 0.4 % Soln ophthalmic solution    ARTIFICIAL TEARS    1 Bottle    Place 1 drop into both eyes every hour as needed for dry eyes    Dry eyes, bilateral       magnesium hydroxide 400 MG/5ML suspension    MILK OF MAGNESIA     Take 5 mLs by mouth daily as needed for constipation or heartburn        MELATONIN PO      Take 10 mg by mouth At Bedtime        metoprolol tartrate 100 MG tablet    LOPRESSOR    60 tablet    Take 1 tablet (100 mg) by mouth 2 times daily    Hypertension       morphine sulfate HIGH CONCENTRATE 20 mg/mL (HIGH CONC) solution    ROXANOL     Take 5 mg by mouth every 4 hours as needed for shortness of breath / dyspnea or moderate to severe pain        ondansetron 4 MG ODT tab    ZOFRAN-ODT     DISSOLVE 1 TABLET ON TONGUE EVERY 6 HOURS AS NEEDED FOR NAUSEA        oxyCODONE IR 5 MG tablet    ROXICODONE     Take 5 mg by mouth every 4 hours as needed Indications: Pain        psyllium 0.52 g capsule      Take 1 capsule by mouth daily        * warfarin 2.5 MG tablet    COUMADIN    30 tablet    Take as directed by [facility]    Permanent atrial fibrillation (H)       * warfarin 5 MG tablet    COUMADIN    30 tablet    Take 5 mg by mouth daily Take 7.5mg Mon, Wed, Fri; 5 mg all other days or as directed by warfarin clinic    Long-term (current) use of anticoagulants, S/P carotid endarterectomy, History  of stroke       * warfarin 5 MG tablet    COUMADIN    35 tablet    7.5mg Mon,Wed,Fri and 5mg all other days or as directed by warfarin clinic    Long term current use of anticoagulant therapy       WELLBUTRIN XL PO      Take 450 mg by mouth daily        * Notice:  This list has 3 medication(s) that are the same as other medications prescribed for you. Read the directions carefully, and ask your doctor or other care provider to review them with you.

## 2018-05-17 NOTE — PROGRESS NOTES
PHYSICIAN CERTIFICATION OF TERMINAL ILLNESS FOR HOSPICE BENEFIT    May 20, 2018    Ricarda Bae    1935      Effective Date of Certification    Start Date:  05/20/18      End Date:  07/18/18    Terminal Diagnosis:    HF      Secondary Diagnoses:     Atrial fibrillation      Hypertension       Prognosis Related Comorbidities:    Anemia     Depression      Narrative Statement:  Client suffers from end stage heart failure and is on maximal tolerated medical therapy.  She continues to decline. Client resides at home and requires assistance with ADL's.  Goals of symptom control will be met.  Because of the progressive nature of the illness and associated comorbidities, client qualifies for hospice care.             I certify that this patient is terminally ill and has a life expectancy of 6 months or less if the terminal illness runs its anticipated course.        Attestation:  I confirm that this narrative was composed by myself and is based on review of the medical record and examination of the patient in face to face encounter completed by Evelyne Romero NP on 05-15-18.            Emanuel Saez MD

## 2018-07-18 NOTE — PROGRESS NOTES
PHYSICIAN CERTIFICATION OF TERMINAL ILLNESS FOR HOSPICE BENEFIT    July 18, 2018      Ricarda Bae    1935      Effective Date of Certification    Start Date:  07/19/18      End Date:  09/16/18    Terminal Diagnosis:    HF      Secondary Diagnoses:     Atrial fibrillation      Hypertension       Prognosis Related Comorbidities:    Anemia     Depression      Narrative Statement:  Client suffers from end stage heart failure and is on maximal tolerated medical therapy.  She continues to decline. Client resides at home and requires assistance with ADL's.  Goals of symptom control will be met.  Because of the progressive nature of the illness and associated comorbidities, client qualifies for hospice care.             I certify that this patient is terminally ill and has a life expectancy of 6 months or less if the terminal illness runs its anticipated course.        Attestation:  I confirm that this narrative was composed by myself and is based on review of the medical record and examination of the patient.            Emanuel Saez MD

## 2018-07-18 NOTE — MR AVS SNAPSHOT
After Visit Summary   7/18/2018    Ricarda Bae    MRN: [id number]           Patient Information     Date Of Birth          1935        Visit Information        Provider Department      7/18/2018 1:20 PM Emanuel Saez MD [facility] [place]        Today's Diagnoses     Medicare Hospice Face to Face Recertification    -  1       Follow-ups after your visit        Your next 10 appointments already scheduled     Aug 21, 2018 11:30 AM CDT   (Arrive by 11:15 AM)   Office Visit with Romy Ferris MD   [facility] [place] ([facility] [place] )    [address]  [place] [address]   [phone]           Bring a current list of meds and any records pertaining to this visit. For Physicals, please bring immunization records and any forms needing to be filled out. Please arrive 10 minutes early to complete paperwork.            Sep 11, 2018  2:30 PM CDT   (Arrive by 2:15 PM)   Home Initial Visit with Evelyne Romero NP   [facility] [place] ([facility] [place] )    [address]  [place] [address]   [phone]            Nov 13, 2018 11:00 AM CST   (Arrive by 10:45 AM)   Home Initial Visit with Evelyen Romero NP   [facility] [place] ([facility] [place] )    [address]  [place] [address]   [phone]              Who to contact     If you have questions or need follow up information about today's clinic visit or your schedule please contact [facility] directly at [phone].  Normal or non-critical lab and imaging results will be communicated to you by MyChart, letter or phone within 4 business days after the clinic has received the results. If you do not hear from us within 7 days, please contact the clinic through MyChart or phone. If you have a critical or abnormal lab result, we will notify you by phone as soon as possible.  Submit refill requests through [employer] or call your pharmacy and they will  "forward the refill request to us. Please allow 3 business days for your refill to be completed.          Additional Information About Your Visit        [employer] Information     [employer] lets you send messages to your doctor, view your test results, renew your prescriptions, schedule appointments and more. To sign up, go to [email or web address] . Click on \"Log in\" on the left side of the screen, which will take you to the Welcome page. Then click on \"Sign up Now\" on the right side of the page.     You will be asked to enter the access code listed below, as well as some personal information. Please follow the directions to create your username and password.     Your access code is: [email or web address]  Expires: 8/15/2018  8:21 AM     Your access code will  in 90 days. If you need help or a new code, please call your [place] clinic or [phone].        Care EveryWhere ID     This is your Care EveryWhere ID. This could be used by other organizations to access your [place] medical records  [id number]         Blood Pressure from Last 3 Encounters:   17 119/58   16 162/100   16 138/68    Weight from Last 3 Encounters:   06/10/17 135 lb 2.3 oz (61.3 kg)   16 110 lb (49.9 kg)   16 140 lb 9.6 oz (63.8 kg)              Today, you had the following     No orders found for display         Today's Medication Changes          These changes are accurate as of 18 11:59 PM.  If you have any questions, ask your nurse or doctor.               These medicines have changed or have updated prescriptions.        Dose/Directions    metoprolol tartrate 100 MG tablet   Commonly known as:  LOPRESSOR   This may have changed:  how much to take   Used for:  Hypertension        Dose:  100 mg   Take 1 tablet (100 mg) by mouth 2 times daily   Quantity:  60 tablet   Refills:  11                Primary Care Provider Office Phone # Fax #    Darien Guillory [phone]       [facility] [address] [address]        Equal Access to Services     FERDINANDMCKENZIE SARY : Hadii carrie sen sudha Soshabnamali, wagarlandda luqadaha, qaybta kamarianada nedashlynoziel, waxay idiin hayjuallie clementsjosehira cortez. So [facility] [phone].    ATENCIÓN: Si habla español, tiene a vora disposición servicios gratuitos de asistencia lingüística. Llame al [phone].    We comply with applicable federal civil rights laws and [place] laws. We do not discriminate on the basis of race, color, national origin, age, disability, sex, sexual orientation, or gender identity.            Thank you!     Thank you for choosing [facility]  for your care. Our goal is always to provide you with excellent care. Hearing back from our patients is one way we can continue to improve our services. Please take a few minutes to complete the written survey that you may receive in the mail after your visit with us. Thank you!             Your Updated Medication List - Protect others around you: Learn how to safely use, store and throw away your medicines at www.disposemymeds.org.          This list is accurate as of 7/18/18 11:59 PM.  Always use your most recent med list.                   Brand Name Dispense Instructions for use Diagnosis    ASPIRIN PO      Take 325 mg by mouth daily        diphenoxylate-atropine 2.5-0.025 MG per tablet    LOMOTIL     TAKE 1 TABLET BY MOUTH two times DAILY AS NEEDED        FLORASTOR 250 MG capsule   Generic drug:  saccharomyces boulardii      TAKE 1 CAPSULE BY MOUTH AT BEDTIME        FUROSEMIDE PO      Take 40 mg by mouth daily        HYDROCHLOROTHIAZIDE PO      Take 25 mg by mouth daily        HYDROcodone-acetaminophen 5-325 MG per tablet    NORCO    15 tablet    Take 1 tablet by mouth every 6 hours as needed for moderate to severe pain    Intussusception of colon (H)       hypromellose 0.4 % Soln ophthalmic solution    ARTIFICIAL TEARS    1 Bottle    Place 1 drop into both eyes every hour as needed for  dry eyes    Dry eyes, bilateral       magnesium hydroxide 400 MG/5ML suspension    MILK OF MAGNESIA     Take 5 mLs by mouth daily as needed for constipation or heartburn        MELATONIN PO      Take 10 mg by mouth At Bedtime        metoprolol tartrate 100 MG tablet    LOPRESSOR    60 tablet    Take 1 tablet (100 mg) by mouth 2 times daily    Hypertension       morphine sulfate HIGH CONCENTRATE 20 mg/mL (HIGH CONC) solution    ROXANOL     Take 5 mg by mouth every 4 hours as needed for shortness of breath / dyspnea or moderate to severe pain        ondansetron 4 MG ODT tab    ZOFRAN-ODT     DISSOLVE 1 TABLET ON TONGUE EVERY 6 HOURS AS NEEDED FOR NAUSEA        oxyCODONE IR 5 MG tablet    ROXICODONE     Take 5 mg by mouth every 4 hours as needed Indications: Pain        psyllium 0.52 g capsule      Take 1 capsule by mouth daily        * warfarin 2.5 MG tablet    COUMADIN    30 tablet    Take as directed by [facility]    Permanent atrial fibrillation (H)       * warfarin 5 MG tablet    COUMADIN    30 tablet    Take 5 mg by mouth daily Take 7.5mg Mon, Wed, Fri; 5 mg all other days or as directed by warfarin clinic    Long-term (current) use of anticoagulants, S/P carotid endarterectomy, History of stroke       * warfarin 5 MG tablet    COUMADIN    35 tablet    7.5mg Mon,Wed,Fri and 5mg all other days or as directed by warfarin clinic    Long term current use of anticoagulant therapy       WELLBUTRIN XL PO      Take 450 mg by mouth daily        * Notice:  This list has 3 medication(s) that are the same as other medications prescribed for you. Read the directions carefully, and ask your doctor or other care provider to review them with you.

## 2018-08-16 NOTE — TELEPHONE ENCOUNTER
Patient's daughter Roxanne is calling in regards to patient's appointment on 08/21 for establish care. Patient is now living in [facility] and they had heard that Dr. Ferris goes there to do rounds. She would like to know if Dr. Ferris could see her there and if she does not need to come to the clinic if we could cancel her appointment she has on 08/21. Please call Roxanne back at [phone] and it is ok to leave a message.

## 2018-08-23 NOTE — MR AVS SNAPSHOT
After Visit Summary   8/23/2018    Ricarda Bae    MRN: [id number]           Patient Information     Date Of Birth          1935        Visit Information        Provider Department      8/23/2018 10:45 AM Romy Ferris MD [facility]        Today's Diagnoses     Chronic atrial fibrillation (H)    -  1    Essential hypertension        Congestive heart failure, unspecified congestive heart failure chronicity, unspecified congestive heart failure type (H)        Other iron deficiency anemia        Hospice care patient        Long-term (current) use of anticoagulants [Z79.01]        Cardiac pacemaker - Medtronic dual lead pacemaker - Not Dependent        Signet ring cell adenocarcinoma (H)        CVD (cerebrovascular disease)           Follow-ups after your visit        Your next 10 appointments already scheduled     Sep 05, 2018  2:40 PM CDT   (Arrive by 2:25 PM)   Home Initial Visit with Emanuel Saez MD   [facility] ([facility] )    [address] [place] [address]   [phone]            Nov 13, 2018 11:00 AM CST   (Arrive by 10:45 AM)   Home Initial Visit with Evelyne Romero NP   [facility] ([facility] )    [address] [place] [address]   [phone]              Who to contact     If you have questions or need follow up information about today's clinic visit or your schedule please contact [facility] directly at [phone].  Normal or non-critical lab and imaging results will be communicated to you by MyChart, letter or phone within 4 business days after the clinic has received the results. If you do not hear from us within 7 days, please contact the clinic through MyChart or phone. If you have a critical or abnormal lab result, we will notify you by phone as soon as possible.  Submit refill requests through [employer] or call your pharmacy and they will forward the refill  "request to us. Please allow 3 business days for your refill to be completed.          Additional Information About Your Visit        Happlink[employer] Information     [employer]ender lets you send messages to your doctor, view your test results, renew your prescriptions, schedule appointments and more. To sign up, go to [email or web address] . Click on \"Log in\" on the left side of the screen, which will take you to the Welcome page. Then click on \"Sign up Now\" on the right side of the page.     You will be asked to enter the access code listed below, as well as some personal information. Please follow the directions to create your username and password.     Your access code is: [email or web address]  Expires: 2018  7:20 AM     Your access code will  in 90 days. If you need help or a new code, please call your [facility] [facility] clinic or [phone].        Care EveryWhere ID     This is your Care EveryWhere ID. This could be used by other organizations to access your [facility] medical records  [id number]         Blood Pressure from Last 3 Encounters:   17 119/58   16 162/100   16 138/68    Weight from Last 3 Encounters:   06/10/17 135 lb 2.3 oz (61.3 kg)   16 110 lb (49.9 kg)   16 140 lb 9.6 oz (63.8 kg)              Today, you had the following     No orders found for display         Today's Medication Changes          These changes are accurate as of 18 11:59 PM.  If you have any questions, ask your nurse or doctor.               These medicines have changed or have updated prescriptions.        Dose/Directions    metoprolol tartrate 100 MG tablet   Commonly known as:  LOPRESSOR   This may have changed:  how much to take   Used for:  Hypertension        Dose:  100 mg   Take 1 tablet (100 mg) by mouth 2 times daily   Quantity:  60 tablet   Refills:  11                Primary Care Provider Office Phone # Fax #    Darien Guillory [phone]       [facility] [address] " [facility] [address]        Equal Access to Services     [facility] : Hadii carrie sen hadinderjit Soshabnamali, waaxda luqadaha, qaybta kaalmada nedashlynoziel, bry clementsjosehira cortez. So [facility] [phone].    ATENCIÓN: Si habla español, tiene a vora disposición servicios gratuitos de asistencia lingüística. [facility] [phone].    We comply with applicable federal civil rights laws and [place] laws. We do not discriminate on the basis of race, color, national origin, age, disability, sex, sexual orientation, or gender identity.            Thank you!     Thank you for choosing [facility]  for your care. Our goal is always to provide you with excellent care. Hearing back from our patients is one way we can continue to improve our services. Please take a few minutes to complete the written survey that you may receive in the mail after your visit with us. Thank you!             Your Updated Medication List - Protect others around you: Learn how to safely use, store and throw away your medicines at www.disposemymeds.org.          This list is accurate as of 8/23/18 11:59 PM.  Always use your most recent med list.                   Brand Name Dispense Instructions for use Diagnosis    ASPIRIN PO      Take 325 mg by mouth daily        diphenoxylate-atropine 2.5-0.025 MG per tablet    LOMOTIL     TAKE 1 TABLET BY MOUTH two times DAILY AS NEEDED        FLORASTOR 250 MG capsule   Generic drug:  saccharomyces boulardii      TAKE 1 CAPSULE BY MOUTH AT BEDTIME        FUROSEMIDE PO      Take 40 mg by mouth daily        HYDROCHLOROTHIAZIDE PO      Take 25 mg by mouth daily        HYDROcodone-acetaminophen 5-325 MG per tablet    NORCO    15 tablet    Take 1 tablet by mouth every 6 hours as needed for moderate to severe pain    Intussusception of colon (H)       hypromellose 0.4 % Soln ophthalmic solution    ARTIFICIAL TEARS    1 Bottle    Place 1 drop into both eyes every hour as needed for dry eyes    Dry  eyes, bilateral       magnesium hydroxide 400 MG/5ML suspension    MILK OF MAGNESIA     Take 5 mLs by mouth daily as needed for constipation or heartburn        MELATONIN PO      Take 10 mg by mouth At Bedtime        metoprolol tartrate 100 MG tablet    LOPRESSOR    60 tablet    Take 1 tablet (100 mg) by mouth 2 times daily    Hypertension       morphine sulfate HIGH CONCENTRATE 20 mg/mL (HIGH CONC) solution    ROXANOL     Take 5 mg by mouth every 4 hours as needed for shortness of breath / dyspnea or moderate to severe pain        ondansetron 4 MG ODT tab    ZOFRAN-ODT     DISSOLVE 1 TABLET ON TONGUE EVERY 6 HOURS AS NEEDED FOR NAUSEA        oxyCODONE IR 5 MG tablet    ROXICODONE     Take 5 mg by mouth every 4 hours as needed Indications: Pain        psyllium 0.52 g capsule      Take 1 capsule by mouth daily        * warfarin 2.5 MG tablet    COUMADIN    30 tablet    Take as directed by [facility]    Permanent atrial fibrillation (H)       * warfarin 5 MG tablet    COUMADIN    30 tablet    Take 5 mg by mouth daily Take 7.5mg Mon, Wed, Fri; 5 mg all other days or as directed by warfarin clinic    Long-term (current) use of anticoagulants, S/P carotid endarterectomy, History of stroke       * warfarin 5 MG tablet    COUMADIN    35 tablet    7.5mg Mon,Wed,Fri and 5mg all other days or as directed by warfarin clinic    Long term current use of anticoagulant therapy       WELLBUTRIN XL PO      Take 450 mg by mouth daily        * Notice:  This list has 3 medication(s) that are the same as other medications prescribed for you. Read the directions carefully, and ask your doctor or other care provider to review them with you.

## 2018-08-23 NOTE — PROGRESS NOTES
HISTORY OF PRESENT ILLNESS:      Ricarda is a 83 year old female (1935)  resident of [facility]  who is being seen today for an Initial Assessment.     Patient has a history of CHF (on hospice), .A fib, CVA  (with residual right sided weakness) and signet ring adenocarcinoma s/p colectomy. She is on hospice related to her HF diagnosis. Of note the colon cancer was diagnosed 10/2016 and while resection was performed, no chemotherapy was persued. She did have 6/33 nodes positive during time of resection.     Patient was admitted to this facility last week for increasing weakness. She had previously been home with her  who was her caretakers. About 2 weeks ago, however, patient appeared to become acutely weak, unable to assist with transfers. They feel she likely had another stroke. They decided to pursue long term care at that time.      Discussed with nursing staff who have the following concerns: None.     Patient is seen in their room. Family is present,  and daughter. Pt is currently noting significant lower abd and low back pain. This has been improved with heating pad and pain medications. She is still having bowel movements. Family questions whether or not any imaging of the abdomen would be useful. They are wondering if the cancer has returned in her abdomen. She is not vomiting. Appetite is poor, but does not result in nausea.     Current medications, allergies, and interdisciplinary care plan are reviewed.    Patient Active Problem List    Diagnosis Date Noted     Medicare Hospice Face to Face Recertification 09/16/2017     Priority: Medium     Acute on chronic congestive heart failure with right ventricular diastolic dysfunction (H) 06/10/2017     Priority: Medium     Long-term (current) use of anticoagulants [Z79.01] 04/13/2017     Priority: Medium     Cardiac pacemaker - Medtronic dual lead pacemaker - Not Dependent 04/11/2017     Priority: Medium      Signet ring cell adenocarcinoma (H) 11/03/2016     Priority: Medium     Small bowel obstruction 10/27/2016     Priority: Medium     Intussusception of colon (H) 10/26/2016     Priority: Medium     SBO (small bowel obstruction) 10/24/2016     Priority: Medium     Bowel obstruction 10/20/2016     Priority: Medium     Hospice care patient 05/24/2016     Priority: Medium     Anemia 12/31/2015     Priority: Medium     Essential hypertension 04/20/2015     Priority: Medium     CHF (congestive heart failure) (H) 04/20/2015     Priority: Medium     Hypertension, poor control 04/20/2015     Priority: Medium     S/P carotid endarterectomy 04/11/2013     Priority: Medium     History of stroke 04/09/2013     Priority: Medium     A-fib (H) 04/09/2013     Priority: Medium     Carotid stenosis 04/09/2013     Priority: Medium          Social History     Social History     Marital status:      Spouse name: Aleksandar     Number of children: 4     Years of education: 16     Occupational History      Retired     Social History Main Topics     Smoking status: Former Smoker     Types: Cigarettes     Quit date: 8/9/2012     Smokeless tobacco: Never Used     Alcohol use 3.5 oz/week     7 Standard drinks or equivalent per week      Comment: 7 drinks a week     Drug use: No     Sexual activity: Not on file     Other Topics Concern     Not on file     Social History Narrative        Current Outpatient Prescriptions   Medication Sig     ASPIRIN PO Take 325 mg by mouth daily      BuPROPion HCl (WELLBUTRIN XL PO) Take 450 mg by mouth daily      diphenoxylate-atropine (LOMOTIL) 2.5-0.025 MG per tablet TAKE 1 TABLET BY MOUTH two times DAILY AS NEEDED     FLORASTOR 250 MG capsule TAKE 1 CAPSULE BY MOUTH AT BEDTIME     FUROSEMIDE PO Take 40 mg by mouth daily     HYDROCHLOROTHIAZIDE PO Take 25 mg by mouth daily     HYDROcodone-acetaminophen (NORCO) 5-325 MG per tablet Take 1 tablet by mouth every 6 hours as needed for  moderate to severe pain     hypromellose (ARTIFICIAL TEARS) 0.4 % SOLN Place 1 drop into both eyes every hour as needed for dry eyes     magnesium hydroxide (MILK OF MAGNESIA) 400 MG/5ML suspension Take 5 mLs by mouth daily as needed for constipation or heartburn     MELATONIN PO Take 10 mg by mouth At Bedtime     metoprolol (LOPRESSOR) 100 MG tablet Take 1 tablet (100 mg) by mouth 2 times daily (Patient taking differently: Take 50 mg by mouth 2 times daily )     morphine sulfate HIGH CONCENTRATE (ROXANOL) 20 mg/mL (HIGH CONC) solution Take 5 mg by mouth every 4 hours as needed for shortness of breath / dyspnea or moderate to severe pain     ondansetron (ZOFRAN-ODT) 4 MG disintegrating tablet DISSOLVE 1 TABLET ON TONGUE EVERY 6 HOURS AS NEEDED FOR NAUSEA     oxyCODONE IR (ROXICODONE) 5 MG tablet Take 5 mg by mouth every 4 hours as needed Indications: Pain     psyllium 0.52 G capsule Take 1 capsule by mouth daily     warfarin (COUMADIN) 2.5 MG tablet Take as directed by Replaced by [facility]     warfarin (COUMADIN) 5 MG tablet 7.5mg Mon,Wed,Fri and 5mg all other days or as directed by warfarin clinic     warfarin (COUMADIN) 5 MG tablet Take 5 mg by mouth daily Take 7.5mg Mon, Wed, Fri; 5 mg all other days or as directed by warfarin clinic     No current facility-administered medications for this visit.      Facility-Administered Medications Ordered in Other Visits   Medication     glycopyrrolate (ROBINUL) injection     neostigmine (PROSTIGMINE) injection       No Known Allergies    I have reviewed the care plan and do agree with the plan.    ROS:  No chest pain, shortness of breath, fever, chills, headache, nausea, vomiting, dysuria, or changes in bowel habits.  Appetite is poor.  abd pain noted.    OBJECTIVE:  Reviewed in SNF chart and stable.     GENERAL:  Alert, and in no acute distress  RESP:  Lungs clear.  No rales, rhonchi, or wheezing  CV:  Irr Irr  ABD: Firm, not distended, non tender to palpation   SKIN:   Age-related changes.  No suspicious lesions or rashes.  PSYCH:  Affect is bright, humorous. .   NEURO: Mental status is alter, memory is poor  EXTREM: No LE edema.     Lab/Diagnostic data:    Reviewed in Epic    ASSESSMENT/ORDERS:  Diagnoses and all orders for this visit:    Chronic atrial fibrillation (H) / Long-term (current) use of anticoagulants [Z79.01] :Rate controlled. No s/s of bleeding at this time.     Essential hypertension: BPs are acceptable at this time.     Congestive heart failure, unspecified congestive heart failure chronicity, unspecified congestive heart failure type (H) / Hospice care patient: Pt is euvolemic.     Signet ring cell adenocarcinoma (H) : Given abd pain, would not be surprised if there was possible recurrence of cancer. We reviewed potential benefits of getting imaging which include assistance with prognosis and potentially some symptoms improvement if impending SBO, however, transport of patient is a barrier and we can likely continue to monitor for SBO symptoms and act if these present. Family ultimately decided against imaging at this time.     CVD (cerebrovascular disease): With right hemiparesis and now with abrupt onset of LE weakness as well. Is possible that patient continues to have riggins, despite coumadin. Symptoms have not been progressive it does not appear, as hemorrhage would be a concern. Will continue to monitor.     Total time spent with patient visit was 35 min including patient visit, review of pertinent clinical information, and treatment plan.    Romy Ferris MD

## 2022-04-26 NOTE — PLAN OF CARE
Impression: Visual distortions of shape and size: H53.15. Plan: OCTm reviewed and Discussed that YAG OS has already been done to help vision,  
anterior segment is clear and explained  decreased vision likely due to retina. Encouraged patient to follow up with Dr. Alison Guerrero as she is already established, will refer patient today within 1-2 weeks. Problem: Patient Goal: Rt Focus  Goal: 1. Patient Goal: RT Focus  Pt will return to baseline respiratory status   Pt SpO2 93% on 3lpm n/c.  BS with crackles in bases.  No nebs given this shift.